# Patient Record
Sex: MALE | Race: WHITE | Employment: FULL TIME | URBAN - METROPOLITAN AREA
[De-identification: names, ages, dates, MRNs, and addresses within clinical notes are randomized per-mention and may not be internally consistent; named-entity substitution may affect disease eponyms.]

---

## 2018-06-08 ENCOUNTER — OFFICE VISIT (OUTPATIENT)
Dept: FAMILY MEDICINE CLINIC | Facility: CLINIC | Age: 43
End: 2018-06-08

## 2018-06-08 VITALS
DIASTOLIC BLOOD PRESSURE: 80 MMHG | BODY MASS INDEX: 38.22 KG/M2 | HEART RATE: 101 BPM | TEMPERATURE: 98 F | SYSTOLIC BLOOD PRESSURE: 138 MMHG | OXYGEN SATURATION: 98 % | HEIGHT: 69.5 IN | RESPIRATION RATE: 14 BRPM | WEIGHT: 264 LBS

## 2018-06-08 DIAGNOSIS — Z13.31 NEGATIVE DEPRESSION SCREENING: ICD-10-CM

## 2018-06-08 DIAGNOSIS — I10 ESSENTIAL HYPERTENSION: ICD-10-CM

## 2018-06-08 DIAGNOSIS — E78.5 HYPERLIPIDEMIA, UNSPECIFIED HYPERLIPIDEMIA TYPE: ICD-10-CM

## 2018-06-08 DIAGNOSIS — Z99.89 OSA ON CPAP: Primary | ICD-10-CM

## 2018-06-08 DIAGNOSIS — G47.33 OSA ON CPAP: Primary | ICD-10-CM

## 2018-06-08 PROBLEM — M54.50 ACUTE BILATERAL LOW BACK PAIN WITHOUT SCIATICA: Status: ACTIVE | Noted: 2017-02-23

## 2018-06-08 PROCEDURE — 99203 OFFICE O/P NEW LOW 30 MIN: CPT | Performed by: FAMILY MEDICINE

## 2018-06-08 RX ORDER — ASPIRIN 81 MG/1
81 TABLET, CHEWABLE ORAL
COMMUNITY
Start: 2015-02-11 | End: 2022-02-07

## 2018-06-08 RX ORDER — CYCLOBENZAPRINE HCL 10 MG
10 TABLET ORAL
COMMUNITY
Start: 2018-02-01 | End: 2018-06-15

## 2018-06-08 RX ORDER — LISINOPRIL AND HYDROCHLOROTHIAZIDE 25; 20 MG/1; MG/1
1 TABLET ORAL
COMMUNITY
Start: 2018-02-01 | End: 2018-06-15

## 2018-06-08 RX ORDER — ZOLPIDEM TARTRATE 10 MG/1
TABLET ORAL
COMMUNITY
Start: 2018-05-24 | End: 2018-06-25

## 2018-06-08 NOTE — PROGRESS NOTES
HPI:    Patient ID: Demond Yip is a 37year old male. HPI  Patient is here to establish care. He has a history of restless leg syndrome and had been tried on Ambien. Also has high blood pressure and takes medicine for it.   Also has high choles counseled at length regarding the above conditions. He does use CPAP for sleep apnea and complication of sleep sleep apnea discussed. Need for weight reduction discussed counseled regarding good diet and exercise. Blood pressure is borderline high.   We

## 2018-06-15 RX ORDER — ZOLPIDEM TARTRATE 10 MG/1
TABLET ORAL
Refills: 0 | Status: CANCELLED | OUTPATIENT
Start: 2018-06-15

## 2018-06-15 RX ORDER — LISINOPRIL AND HYDROCHLOROTHIAZIDE 25; 20 MG/1; MG/1
1 TABLET ORAL DAILY
Qty: 90 TABLET | Refills: 0 | Status: SHIPPED | OUTPATIENT
Start: 2018-06-15 | End: 2019-02-27

## 2018-06-15 RX ORDER — CYCLOBENZAPRINE HCL 10 MG
10 TABLET ORAL NIGHTLY PRN
Qty: 30 TABLET | Refills: 0 | Status: SHIPPED | OUTPATIENT
Start: 2018-06-15 | End: 2018-08-27

## 2018-06-15 NOTE — TELEPHONE ENCOUNTER
Requesting Zolpidem, Lisinopril/Hctz, Cyclobenzaprine  LOV: 6/8/18  RTC: not noted  Last Relevant Labs: none on file  Filled: we have never filled any of these medications    No future appointments.     Last [place]25[place]18 [place]

## 2018-06-15 NOTE — TELEPHONE ENCOUNTER
Pt was in last week and had given  the list of medications that need to be filled. New patient.     Zolpidem Tartrate 10 MG Oral Tab   5/24/2018     Sig: TAKE 1 TABLET BY MOUTH EVERY NIGHT AT BEDTIME AS NEEDED    Class: Historical      Lisinopril-

## 2018-06-25 RX ORDER — ZOLPIDEM TARTRATE 10 MG/1
TABLET ORAL
Qty: 30 TABLET | Refills: 0 | Status: SHIPPED | OUTPATIENT
Start: 2018-06-25 | End: 2018-07-23

## 2018-06-25 NOTE — TELEPHONE ENCOUNTER
Requested Medications   Zolpidem Tartrate 10 MG Oral Tab  N/A       Disp: Not specified Refills: 0    Class: Normal Start: 6/25/2018   Documented:2 weeks ago    LOV 6/8/18- new pt appt  Pt needs refill on med.  Never been prescribed by pcp  No future appoin

## 2018-06-25 NOTE — TELEPHONE ENCOUNTER
Pt recently became a pt of Dr. Aneudy Reyez and one of his scripts didn't get refilled     Zolpidem Tartrate 10 MG Oral Tab    Emi

## 2018-07-23 RX ORDER — ZOLPIDEM TARTRATE 10 MG/1
TABLET ORAL
Qty: 30 TABLET | Refills: 2 | Status: SHIPPED
Start: 2018-07-23 | End: 2018-10-25

## 2018-07-23 NOTE — TELEPHONE ENCOUNTER
Requesting Zolpidem   LOV: 6/8/18  RTC: 1/2018  Last Relevant Labs: n/a   Filled: 6/25/18 #30 with 0 refills    No future appointments[place]18 for #30/30 day supply. Pended if okay.

## 2018-08-28 RX ORDER — CYCLOBENZAPRINE HCL 10 MG
TABLET ORAL
Qty: 30 TABLET | Refills: 0 | Status: SHIPPED | OUTPATIENT
Start: 2018-08-28 | End: 2018-11-26

## 2018-08-28 NOTE — TELEPHONE ENCOUNTER
Requesting Cyclobenzaprine   LOV: 6/8/18  RTC: none   Last Relevant Labs: n/a   Filled: 6/15/18 #30 with 0 refills    No future appointments. Pended if okay.

## 2018-09-07 ENCOUNTER — HOSPITAL ENCOUNTER (OUTPATIENT)
Dept: CT IMAGING | Age: 43
Discharge: HOME | End: 2018-09-07
Attending: INTERNAL MEDICINE

## 2018-09-07 DIAGNOSIS — Z13.9 SCREENING FOR CONDITION: ICD-10-CM

## 2018-10-25 RX ORDER — ZOLPIDEM TARTRATE 10 MG/1
TABLET ORAL
Qty: 30 TABLET | Refills: 1 | Status: SHIPPED
Start: 2018-10-25 | End: 2019-02-27

## 2018-10-25 NOTE — TELEPHONE ENCOUNTER
Requesting Zolpidem  LOV: 6/8/18  RTC: not noted  Last Relevant Labs: n/a  Filled: 7/23/18 #30 with 2 refills    No future appointments.     Last [place] 9[place]29/18 [place]    I have pended a 2 month RX to have him f/u in December if you

## 2018-10-26 NOTE — TELEPHONE ENCOUNTER
Spoke to [facility] pharmacist at [address], called in prescription for Zolpidem Tartrate 10mg, pharmacists verbalized understanding with intent to inform pt when ready to be picked up.       Disp Refills Start End    ZOLPIDEM TARTRATE 10 MG Oral Tab

## 2018-11-28 NOTE — TELEPHONE ENCOUNTER
Requesting Cyclobenzaprine  LOV: 6/8/18  RTC: not noted  Last Relevant Labs: n/a  Filled: 8/28/18 #30 with 0 refills    No future appointments.     *used for prn back spasms    Non protocol med pended for approval

## 2018-11-29 RX ORDER — CYCLOBENZAPRINE HCL 10 MG
TABLET ORAL
Qty: 30 TABLET | Refills: 0 | Status: SHIPPED | OUTPATIENT
Start: 2018-11-29 | End: 2019-02-27

## 2019-01-01 ENCOUNTER — MED REC SCAN ONLY (OUTPATIENT)
Dept: FAMILY MEDICINE CLINIC | Facility: CLINIC | Age: 44
End: 2019-01-01

## 2019-02-06 ENCOUNTER — TELEPHONE (OUTPATIENT)
Dept: FAMILY MEDICINE CLINIC | Facility: CLINIC | Age: 44
End: 2019-02-06

## 2019-02-06 NOTE — TELEPHONE ENCOUNTER
Future Appointments   Date Time Provider Radha Jasmyn   2/9/2019  8:40 AM Mary Galloway MD [facility] [address] [facility] [address]     Lab report in MA blue bin. Will hold until appt.

## 2019-02-09 ENCOUNTER — OFFICE VISIT (OUTPATIENT)
Dept: FAMILY MEDICINE CLINIC | Facility: CLINIC | Age: 44
End: 2019-02-09
Payer: COMMERCIAL

## 2019-02-09 VITALS
HEIGHT: 69.5 IN | RESPIRATION RATE: 16 BRPM | HEART RATE: 101 BPM | SYSTOLIC BLOOD PRESSURE: 130 MMHG | BODY MASS INDEX: 38.95 KG/M2 | WEIGHT: 269 LBS | OXYGEN SATURATION: 99 % | DIASTOLIC BLOOD PRESSURE: 80 MMHG | TEMPERATURE: 99 F

## 2019-02-09 DIAGNOSIS — Z00.00 ROUTINE ADULT HEALTH MAINTENANCE: Primary | ICD-10-CM

## 2019-02-09 DIAGNOSIS — R73.03 PREDIABETES: ICD-10-CM

## 2019-02-09 PROCEDURE — 99396 PREV VISIT EST AGE 40-64: CPT | Performed by: FAMILY MEDICINE

## 2019-02-09 NOTE — PROGRESS NOTES
Sawyer Monteiro is a 37year old male who presents for a complete physical exam.   HPI:   Pt complains of nothing.   Urination changes no  ED symptoms no  Immunizations needed no  Wt Readings from Last 6 Encounters:  02/09/19 : 269 lb  06/08/18 : 264 lb glands/polyuria/polydypsia  ALL/ASTHMA: denies allergic rhinitis/asthma    EXAM:   /80   Pulse 101   Temp 99.2 °F (37.3 °C) (Temporal)   Resp 16   Ht 69.5\"   Wt 269 lb   SpO2 99%   BMI 39.15 kg/m²   Body mass index is 39.15 kg/m².    GENERAL: NAD, pl coronary artery disease screening every 3-5 years. Information given from [facility] so patient can schedule it.      Above age 48 or age 36 if family history of colon or prostate cancer, patient instructed to get colon cancer screening and prostate cancer scre

## 2019-02-28 RX ORDER — LISINOPRIL AND HYDROCHLOROTHIAZIDE 25; 20 MG/1; MG/1
1 TABLET ORAL DAILY
Qty: 90 TABLET | Refills: 0 | Status: SHIPPED | OUTPATIENT
Start: 2019-02-28 | End: 2019-07-11

## 2019-02-28 RX ORDER — CYCLOBENZAPRINE HCL 10 MG
TABLET ORAL
Qty: 30 TABLET | Refills: 0 | Status: SHIPPED | OUTPATIENT
Start: 2019-02-28 | End: 2021-05-19

## 2019-02-28 RX ORDER — ZOLPIDEM TARTRATE 10 MG/1
TABLET ORAL
Qty: 30 TABLET | Refills: 0 | Status: SHIPPED | OUTPATIENT
Start: 2019-02-28 | End: 2019-03-28

## 2019-02-28 NOTE — TELEPHONE ENCOUNTER
Patient can f/u with me for weight mgmt as well in the next week if desired. Strongly recommend weight reduction.

## 2019-03-28 NOTE — TELEPHONE ENCOUNTER
Requesting ZOLPIDEM TARTRATE 10 MG Oral   LOV: 2/9/19  RTC: 6 mos  Last Labs: n/a  Filled: 2/28/19 #30with 0 refills    No future appointments.    Dispensed Written Strength Quantity Refills Days Supply Provider Pharmacy   ZOLPIDEM TARTRATE 01/26/2019 10/25

## 2019-03-29 RX ORDER — ZOLPIDEM TARTRATE 10 MG/1
TABLET ORAL
Qty: 30 TABLET | Refills: 0 | Status: SHIPPED
Start: 2019-03-29 | End: 2019-04-27

## 2019-04-30 RX ORDER — ZOLPIDEM TARTRATE 10 MG/1
TABLET ORAL
Qty: 30 TABLET | Refills: 2 | Status: SHIPPED
Start: 2019-04-30 | End: 2019-07-25

## 2019-04-30 NOTE — TELEPHONE ENCOUNTER
Requesting ZOLPIDEM TARTRATE 10 MG   LOV: 2/9/19  RTC: 6 months  Last Relevant Labs:   Filled: 3/29/19 # 30 with 0 refills    No future appointments.     ZOLPIDEM TARTRATE 01/26/2019 10/25/2018 10MG 30 tablet 1 30 , Anni Patel MD

## 2019-06-26 RX ORDER — FLUTICASONE PROPIONATE 50 MCG
2 SPRAY, SUSPENSION (ML) NASAL DAILY
Qty: 16 G | Refills: 1 | Status: SHIPPED | OUTPATIENT
Start: 2019-06-26 | End: 2019-09-19

## 2019-06-26 NOTE — TELEPHONE ENCOUNTER
Patient states he uses Flonase intermittently and needs a refill. [facility] will not fill it, they say it has been too long, please advise.

## 2019-06-26 NOTE — TELEPHONE ENCOUNTER
See phone message. Pt states he uses Flonase as needed and is requesting refill. Rx pended. Please advise.

## 2019-07-12 RX ORDER — LISINOPRIL AND HYDROCHLOROTHIAZIDE 25; 20 MG/1; MG/1
1 TABLET ORAL DAILY
Qty: 30 TABLET | Refills: 0 | Status: SHIPPED | OUTPATIENT
Start: 2019-07-12 | End: 2019-09-19

## 2019-07-25 NOTE — TELEPHONE ENCOUNTER
Requesting Zolpidem 10mg  LOV: 2/9/19 for physical  RTC: 6 months  Last Relevant Labs: 2/9/19  Filled: 4/30/19 #30 with 2 refills    No future appointments.     [place] 5[place]30/19 #30    Rx pended for approval/denial

## 2019-07-26 RX ORDER — ZOLPIDEM TARTRATE 10 MG/1
TABLET ORAL
Qty: 30 TABLET | Refills: 0 | Status: SHIPPED | OUTPATIENT
Start: 2019-07-26 | End: 2019-08-29

## 2019-07-26 NOTE — TELEPHONE ENCOUNTER
Faxed Rx for Zolpidem 10mg approved by Dr. Jenene Cabot 7/26/19 to [address]. Fax confirmation received.

## 2019-08-30 RX ORDER — ZOLPIDEM TARTRATE 10 MG/1
TABLET ORAL
Qty: 30 TABLET | Refills: 0 | Status: SHIPPED | OUTPATIENT
Start: 2019-08-30 | End: 2019-09-19

## 2019-08-30 NOTE — TELEPHONE ENCOUNTER
Requested Prescriptions     Pending Prescriptions Disp Refills   • ZOLPIDEM TARTRATE 10 MG Oral Tab [Pharmacy Med Name: ZOLPIDEM 10MG TABLETS] 30 tablet 0     Sig: TAKE 1 TABLET BY MOUTH EVERY DAY AT BEDTIME AS NEEDED     Non protocol medication    LOV: 2/

## 2019-09-04 NOTE — TELEPHONE ENCOUNTER
Requested Prescriptions     Pending Prescriptions Disp Refills   • LISINOPRIL-HYDROCHLOROTHIAZIDE 20-25 MG Oral Tab [Pharmacy Med Name: LISINOPRIL-HCTZ 20/25MG TABLETS] 30 tablet 0     Sig: TAKE 1 TABLET BY MOUTH DAILY, PATIENT NEEDS AN APPOINTMENT     LOV

## 2019-09-08 DIAGNOSIS — I10 ESSENTIAL HYPERTENSION: Primary | ICD-10-CM

## 2019-09-11 RX ORDER — CYCLOBENZAPRINE HCL 10 MG
TABLET ORAL
Qty: 30 TABLET | Refills: 0 | OUTPATIENT
Start: 2019-09-11

## 2019-09-11 RX ORDER — LISINOPRIL AND HYDROCHLOROTHIAZIDE 25; 20 MG/1; MG/1
TABLET ORAL
Qty: 30 TABLET | Refills: 0 | OUTPATIENT
Start: 2019-09-11

## 2019-09-11 RX ORDER — LISINOPRIL AND HYDROCHLOROTHIAZIDE 25; 20 MG/1; MG/1
1 TABLET ORAL DAILY
Qty: 90 TABLET | Refills: 0 | OUTPATIENT
Start: 2019-09-11

## 2019-09-11 NOTE — TELEPHONE ENCOUNTER
Requested Prescriptions     Pending Prescriptions Disp Refills   • LISINOPRIL-HYDROCHLOROTHIAZIDE 20-25 MG Oral Tab [Pharmacy Med Name: LISINOPRIL-HCTZ 20/25MG TABLETS] 90 tablet 0     Sig: TAKE 1 TABLET BY MOUTH DAILY   • CYCLOBENZAPRINE HCL 10 MG Oral Ta

## 2019-09-12 RX ORDER — LISINOPRIL AND HYDROCHLOROTHIAZIDE 25; 20 MG/1; MG/1
TABLET ORAL
Qty: 30 TABLET | Refills: 0 | OUTPATIENT
Start: 2019-09-12

## 2019-09-12 RX ORDER — CYCLOBENZAPRINE HCL 10 MG
TABLET ORAL
Qty: 30 TABLET | Refills: 0 | OUTPATIENT
Start: 2019-09-12

## 2019-09-12 NOTE — TELEPHONE ENCOUNTER
meds denied, needs cmp done nonfasting and f/u appt needed. Last appt >6mths ago. Twice a year appts recommended.

## 2019-09-12 NOTE — TELEPHONE ENCOUNTER
[email or web address] to contact the office to schedule an office visit and complete labs

## 2019-09-19 ENCOUNTER — OFFICE VISIT (OUTPATIENT)
Dept: FAMILY MEDICINE CLINIC | Facility: CLINIC | Age: 44
End: 2019-09-19
Payer: COMMERCIAL

## 2019-09-19 VITALS
WEIGHT: 272 LBS | TEMPERATURE: 99 F | HEIGHT: 69.5 IN | DIASTOLIC BLOOD PRESSURE: 90 MMHG | SYSTOLIC BLOOD PRESSURE: 140 MMHG | HEART RATE: 94 BPM | RESPIRATION RATE: 16 BRPM | BODY MASS INDEX: 39.38 KG/M2 | OXYGEN SATURATION: 98 %

## 2019-09-19 DIAGNOSIS — F51.01 PRIMARY INSOMNIA: ICD-10-CM

## 2019-09-19 DIAGNOSIS — I10 ESSENTIAL HYPERTENSION: Primary | ICD-10-CM

## 2019-09-19 DIAGNOSIS — J30.9 ALLERGIC RHINITIS, UNSPECIFIED SEASONALITY, UNSPECIFIED TRIGGER: ICD-10-CM

## 2019-09-19 PROCEDURE — 90471 IMMUNIZATION ADMIN: CPT | Performed by: FAMILY MEDICINE

## 2019-09-19 PROCEDURE — 99214 OFFICE O/P EST MOD 30 MIN: CPT | Performed by: FAMILY MEDICINE

## 2019-09-19 PROCEDURE — 90686 IIV4 VACC NO PRSV 0.5 ML IM: CPT | Performed by: FAMILY MEDICINE

## 2019-09-20 RX ORDER — LISINOPRIL AND HYDROCHLOROTHIAZIDE 25; 20 MG/1; MG/1
1 TABLET ORAL DAILY
Qty: 90 TABLET | Refills: 3 | Status: SHIPPED | OUTPATIENT
Start: 2019-09-20 | End: 2020-09-29

## 2019-09-20 RX ORDER — ZOLPIDEM TARTRATE 10 MG/1
TABLET ORAL
Qty: 30 TABLET | Refills: 2 | Status: SHIPPED | OUTPATIENT
Start: 2019-09-20 | End: 2019-12-26

## 2019-09-20 RX ORDER — FLUTICASONE PROPIONATE 50 MCG
2 SPRAY, SUSPENSION (ML) NASAL DAILY
Qty: 16 G | Refills: 3 | Status: SHIPPED | OUTPATIENT
Start: 2019-09-20 | End: 2019-12-19

## 2019-12-26 RX ORDER — ZOLPIDEM TARTRATE 10 MG/1
TABLET ORAL
Qty: 30 TABLET | Refills: 0 | Status: SHIPPED | OUTPATIENT
Start: 2019-12-26 | End: 2020-01-28

## 2019-12-26 NOTE — TELEPHONE ENCOUNTER
Requesting Zolpideam 10mg  LOV: 9/19/19  RTC: prn  Last Relevant Labs: 2/14/19  Filled: 9/20/19 #30 with 2 refills    No future appointments.     Rx pended and routed to MD for approval/denial

## 2020-01-01 ENCOUNTER — MED REC SCAN ONLY (OUTPATIENT)
Dept: FAMILY MEDICINE CLINIC | Facility: CLINIC | Age: 45
End: 2020-01-01

## 2020-01-16 LAB
AMB EXT CHOL/HDL RATIO: 4.6
AMB EXT CHOLESTEROL, TOTAL: 224 MG/DL
AMB EXT GLUCOSE: 106 MG/DL
AMB EXT HDL CHOLESTEROL: 49 MG/DL
AMB EXT HEMATOCRIT: 49.1
AMB EXT HEMOGLOBIN: 16
AMB EXT LDL CHOLESTEROL, DIRECT: 142 MG/DL
AMB EXT MCV: 82
AMB EXT PLATELETS: 272
AMB EXT TRIGLYCERIDES: 165 MG/DL
AMB EXT WBC: 8.3 X10(3)UL

## 2020-01-28 RX ORDER — ZOLPIDEM TARTRATE 10 MG/1
TABLET ORAL
Qty: 30 TABLET | Refills: 0 | Status: SHIPPED
Start: 2020-01-28 | End: 2020-03-04

## 2020-01-28 NOTE — TELEPHONE ENCOUNTER
Name from pharmacy: ZOLPIDEM 10MG TABLETS         Will file in chart as: ZOLPIDEM TARTRATE 10 MG Oral Tab         Sig: TAKE 1 TABLET BY MOUTH EVERY NIGHT AT BEDTIME AS NEEDED    Disp:  30 tablet    Refills:  0 (Pharmacy requested: Not specified)    Start:

## 2020-01-29 ENCOUNTER — OFFICE VISIT (OUTPATIENT)
Dept: FAMILY MEDICINE CLINIC | Facility: CLINIC | Age: 45
End: 2020-01-29
Payer: COMMERCIAL

## 2020-01-29 VITALS
DIASTOLIC BLOOD PRESSURE: 82 MMHG | HEIGHT: 69.5 IN | HEART RATE: 87 BPM | OXYGEN SATURATION: 92 % | SYSTOLIC BLOOD PRESSURE: 130 MMHG | TEMPERATURE: 99 F | BODY MASS INDEX: 40.37 KG/M2 | RESPIRATION RATE: 16 BRPM | WEIGHT: 278.81 LBS

## 2020-01-29 DIAGNOSIS — E66.01 CLASS 3 SEVERE OBESITY DUE TO EXCESS CALORIES WITHOUT SERIOUS COMORBIDITY WITH BODY MASS INDEX (BMI) OF 40.0 TO 44.9 IN ADULT (HCC): ICD-10-CM

## 2020-01-29 DIAGNOSIS — E78.5 HYPERLIPIDEMIA, UNSPECIFIED HYPERLIPIDEMIA TYPE: ICD-10-CM

## 2020-01-29 DIAGNOSIS — N52.9 ERECTILE DYSFUNCTION, UNSPECIFIED ERECTILE DYSFUNCTION TYPE: ICD-10-CM

## 2020-01-29 DIAGNOSIS — Z00.00 ROUTINE ADULT HEALTH MAINTENANCE: Primary | ICD-10-CM

## 2020-01-29 DIAGNOSIS — R73.03 PREDIABETES: ICD-10-CM

## 2020-01-29 PROCEDURE — 99396 PREV VISIT EST AGE 40-64: CPT | Performed by: FAMILY MEDICINE

## 2020-01-29 PROCEDURE — 99214 OFFICE O/P EST MOD 30 MIN: CPT | Performed by: FAMILY MEDICINE

## 2020-01-29 RX ORDER — TADALAFIL 10 MG/1
TABLET ORAL
Qty: 6 TABLET | Refills: 0 | Status: SHIPPED | OUTPATIENT
Start: 2020-01-29

## 2020-01-30 NOTE — PROGRESS NOTES
Tristan Blackburn is a 40year old male who presents for a complete physical exam.   HPI:   Pt complains of having problems maintaining erection. He would like to try medication for it.   Urination changes no  ED symptoms no  Immunizations needed no  Wt Re joint or muscle aches or pains  NEURO: denies headaches/dizziness  PSYCH: denies depression or anxiety  HEMATOLOGIC: denies easy bruising/bleeding/anemia/blood clot disorders  ENDOCRINE: denies weight gain/weight loss/enlargement of neck glands/polyuria/po Tika Yeboah is a 40year old male who presents for a complete physical exam.     Cole Rivera was seen today for lab results.     Diagnoses and all orders for this visit:    Routine adult health maintenance    Class 3 severe obesity due to excess calories without se

## 2020-02-05 ENCOUNTER — TELEPHONE (OUTPATIENT)
Dept: FAMILY MEDICINE CLINIC | Facility: CLINIC | Age: 45
End: 2020-02-05

## 2020-02-05 NOTE — TELEPHONE ENCOUNTER
Patient calling for PA for Tadalafil, pharmacy stated that provider had to submit authorization in order for him to receive medication

## 2020-02-12 NOTE — TELEPHONE ENCOUNTER
Received PA approval from [employer]. Tadalafil 10 mg has been granted 2/8/2020- 2/8/2021. Informed patient pharmacy as well. Attempted to call patient cell phone number, pt did not answer.  I was unable to leave a voicemail , voicemail box is full

## 2020-02-17 ENCOUNTER — APPOINTMENT (OUTPATIENT)
Dept: LAB | Age: 45
End: 2020-02-17
Attending: FAMILY MEDICINE
Payer: COMMERCIAL

## 2020-02-17 DIAGNOSIS — I10 ESSENTIAL HYPERTENSION: ICD-10-CM

## 2020-02-17 DIAGNOSIS — E66.01 CLASS 3 SEVERE OBESITY DUE TO EXCESS CALORIES WITHOUT SERIOUS COMORBIDITY WITH BODY MASS INDEX (BMI) OF 40.0 TO 44.9 IN ADULT (HCC): ICD-10-CM

## 2020-02-17 LAB
ALBUMIN SERPL-MCNC: 3.8 G/DL (ref 3.4–5)
ALBUMIN/GLOB SERPL: 1.1 {RATIO} (ref 1–2)
ALP LIVER SERPL-CCNC: 74 U/L (ref 45–117)
ALT SERPL-CCNC: 28 U/L (ref 16–61)
ANION GAP SERPL CALC-SCNC: 5 MMOL/L (ref 0–18)
AST SERPL-CCNC: 17 U/L (ref 15–37)
BILIRUB SERPL-MCNC: 0.3 MG/DL (ref 0.1–2)
BUN BLD-MCNC: 16 MG/DL (ref 7–18)
BUN/CREAT SERPL: 14.8 (ref 10–20)
CALCIUM BLD-MCNC: 8.9 MG/DL (ref 8.5–10.1)
CHLORIDE SERPL-SCNC: 105 MMOL/L (ref 98–112)
CO2 SERPL-SCNC: 28 MMOL/L (ref 21–32)
CREAT BLD-MCNC: 1.08 MG/DL (ref 0.7–1.3)
GLOBULIN PLAS-MCNC: 3.6 G/DL (ref 2.8–4.4)
GLUCOSE BLD-MCNC: 95 MG/DL (ref 70–99)
M PROTEIN MFR SERPL ELPH: 7.4 G/DL (ref 6.4–8.2)
OSMOLALITY SERPL CALC.SUM OF ELEC: 287 MOSM/KG (ref 275–295)
PATIENT FASTING Y/N/NP: NO
POTASSIUM SERPL-SCNC: 3.5 MMOL/L (ref 3.5–5.1)
SODIUM SERPL-SCNC: 138 MMOL/L (ref 136–145)
T4 FREE SERPL-MCNC: 1 NG/DL (ref 0.8–1.7)
TSI SER-ACNC: 1.49 MIU/ML (ref 0.36–3.74)

## 2020-02-17 PROCEDURE — 36415 COLL VENOUS BLD VENIPUNCTURE: CPT | Performed by: FAMILY MEDICINE

## 2020-02-17 PROCEDURE — 84439 ASSAY OF FREE THYROXINE: CPT | Performed by: FAMILY MEDICINE

## 2020-02-17 PROCEDURE — 84443 ASSAY THYROID STIM HORMONE: CPT | Performed by: FAMILY MEDICINE

## 2020-02-17 PROCEDURE — 80053 COMPREHEN METABOLIC PANEL: CPT | Performed by: FAMILY MEDICINE

## 2020-02-18 ENCOUNTER — TELEPHONE (OUTPATIENT)
Dept: FAMILY MEDICINE CLINIC | Facility: CLINIC | Age: 45
End: 2020-02-18

## 2020-03-06 RX ORDER — ZOLPIDEM TARTRATE 10 MG/1
TABLET ORAL
Qty: 30 TABLET | Refills: 0 | Status: SHIPPED | OUTPATIENT
Start: 2020-03-06 | End: 2020-04-03

## 2020-03-06 NOTE — TELEPHONE ENCOUNTER
Dr.Dongre- Dsouza is calling to follow up on rx request   ZOLPIDEM TARTRATE 10 MG Oral Tab 30 tablet 0 1/28/2020    Sig:   TAKE 1 TABLET BY MOUTH EVERY NIGHT AT BEDTIME AS NEEDED     Route:   (none)             [id number] - [address] [address]

## 2020-04-03 RX ORDER — ZOLPIDEM TARTRATE 10 MG/1
TABLET ORAL
Qty: 30 TABLET | Refills: 0 | Status: SHIPPED | OUTPATIENT
Start: 2020-04-03 | End: 2020-05-04

## 2020-04-03 RX ORDER — ZOLPIDEM TARTRATE 10 MG/1
TABLET ORAL
Qty: 90 TABLET | Refills: 0 | OUTPATIENT
Start: 2020-04-03

## 2020-04-03 NOTE — TELEPHONE ENCOUNTER
Requesting ZOLPIDEM TARTRATE 10 MG   LOV: 1/29/20  RTC:   Last Relevant Labs: 2/17/20  Filled: 3/6/20 #30 with 0 refills    No future appointments.     ZOLPIDEM TARTRATE 01/28/2020 01/28/2020 10MG 30 tablet 0 30 , Anish Mcconnell MD

## 2020-05-04 RX ORDER — ZOLPIDEM TARTRATE 10 MG/1
TABLET ORAL
Qty: 30 TABLET | Refills: 2 | Status: SHIPPED | OUTPATIENT
Start: 2020-05-04 | End: 2020-07-30

## 2020-05-04 NOTE — TELEPHONE ENCOUNTER
Requesting : ZOLPIDEM TARTRATE 10 MG   LOV: 1/29/20  RTC:   Last Relevant Labs: 2/17/20  Filled: 4/3/20 # 30 with 0 refills    No future appointments.     ZOLPIDEM TARTRATE 01/28/2020 01/28/2020 10MG 30 tablet 0 30 , Shannan Huitron MD

## 2020-07-29 NOTE — TELEPHONE ENCOUNTER
Requesting Zolpidem 10mg  LOV: 1/29/2020  RTC: prn  Last Relevant Labs: 2/17/2020  Filled: 5/4/2020 #30 with 2 refills    No future appointments.     [place] 6/30/2020 #30    Rx pended and routed for approval/denial

## 2020-07-30 RX ORDER — ZOLPIDEM TARTRATE 10 MG/1
TABLET ORAL
Qty: 30 TABLET | Refills: 0 | Status: SHIPPED | OUTPATIENT
Start: 2020-07-30 | End: 2020-08-05

## 2020-08-04 ENCOUNTER — TELEPHONE (OUTPATIENT)
Dept: FAMILY MEDICINE CLINIC | Facility: CLINIC | Age: 45
End: 2020-08-04

## 2020-08-05 ENCOUNTER — TELEPHONE (OUTPATIENT)
Dept: FAMILY MEDICINE CLINIC | Facility: CLINIC | Age: 45
End: 2020-08-05

## 2020-08-05 RX ORDER — ZOLPIDEM TARTRATE 10 MG/1
10 TABLET ORAL NIGHTLY PRN
Qty: 30 TABLET | Refills: 0 | Status: SHIPPED | OUTPATIENT
Start: 2020-08-05 | End: 2020-09-29

## 2020-08-05 NOTE — TELEPHONE ENCOUNTER
Pt needs his ZOLPIDEM TARTRATE 10 MG Oral Tab [facility] because [facility] does not have any there on back order

## 2020-09-28 NOTE — TELEPHONE ENCOUNTER
Name from pharmacy: ZOLPIDEM 10MG TABLETS          Will file in chart as: ZOLPIDEM TARTRATE 10 MG Oral Tab    Sig: TAKE 1 TABLET BY MOUTH EVERY NIGHT AT BEDTIME AS NEEDED    Disp:  30 tablet    Refills:  0 (Pharmacy requested: Not specified)    Start: 9/26

## 2020-09-29 RX ORDER — ZOLPIDEM TARTRATE 10 MG/1
TABLET ORAL
Qty: 30 TABLET | Refills: 0 | Status: SHIPPED | OUTPATIENT
Start: 2020-09-29 | End: 2020-11-03

## 2020-09-29 RX ORDER — LISINOPRIL AND HYDROCHLOROTHIAZIDE 25; 20 MG/1; MG/1
1 TABLET ORAL DAILY
Qty: 90 TABLET | Refills: 3 | Status: SHIPPED | OUTPATIENT
Start: 2020-09-29 | End: 2020-12-16

## 2020-11-03 NOTE — TELEPHONE ENCOUNTER
Requesting Zolpidem 10mg  LOV: 1/29/2020 Physical  RTC: 1 year  Last Relevant Labs: 2/17/2020  Filled: 9/29/2020 #30 with 0 refills    No future appointments.     [place] 9/29/2020 #30    Rx pended and routed for approval/denial

## 2020-11-04 RX ORDER — ZOLPIDEM TARTRATE 10 MG/1
10 TABLET ORAL NIGHTLY PRN
Qty: 30 TABLET | Refills: 0 | Status: SHIPPED | OUTPATIENT
Start: 2020-11-04 | End: 2020-12-07

## 2020-12-01 RX ORDER — ZOLPIDEM TARTRATE 10 MG/1
TABLET ORAL
Qty: 30 TABLET | Refills: 0 | OUTPATIENT
Start: 2020-12-01

## 2020-12-01 RX ORDER — CYCLOBENZAPRINE HCL 10 MG
TABLET ORAL
Qty: 30 TABLET | Refills: 0 | OUTPATIENT
Start: 2020-12-01

## 2020-12-01 NOTE — TELEPHONE ENCOUNTER
Requesting Zolpidem 10mg  LOV: 1/29/2020 Physical  RTC: 1 year  Last Relevant Labs: 2/17/2020  Filled: 11/4/2020 #30 with 0 refills    Requesting Cyclobenzaprine 10mg  LOV: 1/29/2020 physical  RTC: 1 year  Last Relevant Labs: 2/17/2020  Filled: 2/28/19 #30 with 0 refills    No future appointments.     RX pended and routed for approval/denial

## 2020-12-07 RX ORDER — ZOLPIDEM TARTRATE 10 MG/1
10 TABLET ORAL NIGHTLY PRN
Qty: 10 TABLET | Refills: 0 | Status: SHIPPED | OUTPATIENT
Start: 2020-12-07 | End: 2021-01-14

## 2020-12-07 NOTE — TELEPHONE ENCOUNTER
Patient is scheduled for Dec 16, that is the 1st available date he has, can we sent him in enough Zolpidem to get him through. RX is pended.

## 2020-12-07 NOTE — TELEPHONE ENCOUNTER
Future Appointments   Date Time Provider Radha Vines   12/16/2020  3:00 PM Leslie Chapin MD [facility] [address] [facility] [address]

## 2020-12-07 NOTE — TELEPHONE ENCOUNTER
Pt will need a refill on the Zolpidem sent to local [facility]. We had to reschedule his appt due to scheduling error.   Future Appointments   Date Time Provider Radha Vines   12/16/2020  3:00 PM Rusty Sanchez MD [facility] [address] [facility] [address]     He

## 2020-12-16 ENCOUNTER — OFFICE VISIT (OUTPATIENT)
Dept: FAMILY MEDICINE CLINIC | Facility: CLINIC | Age: 45
End: 2020-12-16
Payer: COMMERCIAL

## 2020-12-16 VITALS
DIASTOLIC BLOOD PRESSURE: 62 MMHG | HEART RATE: 48 BPM | BODY MASS INDEX: 39.6 KG/M2 | RESPIRATION RATE: 16 BRPM | TEMPERATURE: 97 F | WEIGHT: 273.5 LBS | HEIGHT: 69.5 IN | SYSTOLIC BLOOD PRESSURE: 118 MMHG

## 2020-12-16 DIAGNOSIS — F51.01 PRIMARY INSOMNIA: ICD-10-CM

## 2020-12-16 DIAGNOSIS — I10 ESSENTIAL HYPERTENSION: ICD-10-CM

## 2020-12-16 DIAGNOSIS — N52.9 ERECTILE DYSFUNCTION, UNSPECIFIED ERECTILE DYSFUNCTION TYPE: ICD-10-CM

## 2020-12-16 DIAGNOSIS — R00.1 BRADYCARDIA: Primary | ICD-10-CM

## 2020-12-16 PROCEDURE — 99215 OFFICE O/P EST HI 40 MIN: CPT | Performed by: FAMILY MEDICINE

## 2020-12-16 PROCEDURE — 3074F SYST BP LT 130 MM HG: CPT | Performed by: FAMILY MEDICINE

## 2020-12-16 PROCEDURE — 3008F BODY MASS INDEX DOCD: CPT | Performed by: FAMILY MEDICINE

## 2020-12-16 PROCEDURE — 3078F DIAST BP <80 MM HG: CPT | Performed by: FAMILY MEDICINE

## 2020-12-16 RX ORDER — CYCLOBENZAPRINE HCL 10 MG
10 TABLET ORAL NIGHTLY PRN
Qty: 30 TABLET | Refills: 0 | Status: CANCELLED | OUTPATIENT
Start: 2020-12-16

## 2020-12-16 RX ORDER — TADALAFIL 10 MG/1
TABLET ORAL
Qty: 6 TABLET | Refills: 0 | Status: CANCELLED | OUTPATIENT
Start: 2020-12-16

## 2020-12-16 RX ORDER — ZOLPIDEM TARTRATE 10 MG/1
10 TABLET ORAL NIGHTLY PRN
Qty: 10 TABLET | Refills: 0 | Status: CANCELLED | OUTPATIENT
Start: 2020-12-16

## 2020-12-16 RX ORDER — LISINOPRIL AND HYDROCHLOROTHIAZIDE 25; 20 MG/1; MG/1
1 TABLET ORAL DAILY
Qty: 90 TABLET | Refills: 3 | Status: SHIPPED | OUTPATIENT
Start: 2020-12-16 | End: 2021-06-18

## 2020-12-17 ENCOUNTER — EKG ENCOUNTER (OUTPATIENT)
Dept: LAB | Age: 45
End: 2020-12-17
Attending: FAMILY MEDICINE
Payer: COMMERCIAL

## 2020-12-17 ENCOUNTER — LAB ENCOUNTER (OUTPATIENT)
Dept: LAB | Age: 45
End: 2020-12-17
Attending: FAMILY MEDICINE
Payer: COMMERCIAL

## 2020-12-17 DIAGNOSIS — R00.1 BRADYCARDIA: ICD-10-CM

## 2020-12-17 PROCEDURE — 85027 COMPLETE CBC AUTOMATED: CPT

## 2020-12-17 PROCEDURE — 36415 COLL VENOUS BLD VENIPUNCTURE: CPT

## 2020-12-17 PROCEDURE — 93005 ELECTROCARDIOGRAM TRACING: CPT

## 2020-12-17 PROCEDURE — 93010 ELECTROCARDIOGRAM REPORT: CPT | Performed by: INTERNAL MEDICINE

## 2020-12-17 PROCEDURE — 84481 FREE ASSAY (FT-3): CPT

## 2020-12-17 PROCEDURE — 84439 ASSAY OF FREE THYROXINE: CPT

## 2020-12-17 PROCEDURE — 80048 BASIC METABOLIC PNL TOTAL CA: CPT

## 2020-12-17 PROCEDURE — 84443 ASSAY THYROID STIM HORMONE: CPT

## 2020-12-19 ENCOUNTER — PATIENT MESSAGE (OUTPATIENT)
Dept: FAMILY MEDICINE CLINIC | Facility: CLINIC | Age: 45
End: 2020-12-19

## 2020-12-19 DIAGNOSIS — I49.3 PVC (PREMATURE VENTRICULAR CONTRACTION): Primary | ICD-10-CM

## 2020-12-19 DIAGNOSIS — R94.31 ABNORMAL EKG: ICD-10-CM

## 2020-12-19 RX ORDER — ZOLPIDEM TARTRATE 10 MG/1
10 TABLET ORAL NIGHTLY PRN
Qty: 10 TABLET | Refills: 0 | Status: CANCELLED | OUTPATIENT
Start: 2020-12-19

## 2020-12-21 NOTE — TELEPHONE ENCOUNTER
From: Ann Marie Sevilla  To: Esdras Ham MD  Sent: 12/19/2020 9:39 AM CST  Subject: Test Results Rosalva Pete,     I was looking at the test results and as best I can tell everything seems to be within the normal range.  If not please le

## 2020-12-22 RX ORDER — ZOLPIDEM TARTRATE 10 MG/1
10 TABLET ORAL NIGHTLY PRN
Refills: 0 | OUTPATIENT
Start: 2020-12-22

## 2020-12-22 NOTE — TELEPHONE ENCOUNTER
Labs are fine, but ekg shows some irregular beats called pvc's. Pt needs to have eval by cardiology for this before any sleep aid should be given.

## 2020-12-23 RX ORDER — ZOLPIDEM TARTRATE 10 MG/1
10 TABLET ORAL NIGHTLY PRN
Qty: 30 TABLET | Refills: 0 | OUTPATIENT
Start: 2020-12-23

## 2020-12-23 NOTE — TELEPHONE ENCOUNTER
Dr.Dongre- Dsouza is calling to follow up on refills and test results EKG and Labs. Patient states it was discussed that medications would be renewed once labs were complete. Doctor may want to renew all meds?     Please advise Ph [phone]

## 2020-12-23 NOTE — TELEPHONE ENCOUNTER
Lab results and EKG available from 12/17/2020. Please advise on results. Medication Quantity Refills Start End   Zolpidem Tartrate 10 MG Oral Tab 10 tablet 0 12/7/2020      Last OV 12/16/2020  No future appointments. Medication pended.  Please advis

## 2020-12-28 ENCOUNTER — TELEPHONE (OUTPATIENT)
Dept: FAMILY MEDICINE CLINIC | Facility: CLINIC | Age: 45
End: 2020-12-28

## 2020-12-28 NOTE — TELEPHONE ENCOUNTER
Patient was referred to Dr. Shirin Amin on 12/23/20.    Recommend another cardiologist? Please advise

## 2020-12-29 ENCOUNTER — TELEPHONE (OUTPATIENT)
Dept: FAMILY MEDICINE CLINIC | Facility: CLINIC | Age: 45
End: 2020-12-29

## 2020-12-29 NOTE — TELEPHONE ENCOUNTER
Patient states Dr. Carmela Lora' office needs a copy of the ekg done in [place], FAX to [place] @ [phone].

## 2020-12-29 NOTE — TELEPHONE ENCOUNTER
Spoke to patient. Advised that Dr. Roxie Christy is in [place] and referral was authorized. Also provided info for Dr. Dhruv Sanderson. Patient will call Dr. Roxie Christy again to schedule.

## 2020-12-30 RX ORDER — TADALAFIL 10 MG/1
TABLET ORAL
COMMUNITY
Start: 2020-01-29 | End: 2021-01-04

## 2020-12-30 RX ORDER — ZOLPIDEM TARTRATE 10 MG/1
10 TABLET ORAL PRN
COMMUNITY
Start: 2018-06-18 | End: 2021-01-04

## 2020-12-30 RX ORDER — LISINOPRIL AND HYDROCHLOROTHIAZIDE 25; 20 MG/1; MG/1
1 TABLET ORAL DAILY
COMMUNITY
Start: 2018-02-01

## 2020-12-30 RX ORDER — CYCLOBENZAPRINE HCL 10 MG
TABLET ORAL
COMMUNITY
Start: 2018-02-01 | End: 2021-01-04

## 2020-12-30 RX ORDER — ASPIRIN 81 MG/1
81 TABLET, CHEWABLE ORAL DAILY
COMMUNITY
Start: 2015-02-11

## 2020-12-30 NOTE — TELEPHONE ENCOUNTER
EKG scanned in. He has Bigeminy pattern of PVCs. - extra beats that originate in the ventricles- not necessarily life threatening type of rhythm, can d/w cardiologist.

## 2021-01-01 ENCOUNTER — EXTERNAL RECORD (OUTPATIENT)
Dept: HEALTH INFORMATION MANAGEMENT | Facility: OTHER | Age: 46
End: 2021-01-01

## 2021-01-04 ENCOUNTER — V-VISIT (OUTPATIENT)
Dept: CARDIOLOGY | Age: 46
End: 2021-01-04

## 2021-01-04 VITALS
DIASTOLIC BLOOD PRESSURE: 67 MMHG | WEIGHT: 262 LBS | HEIGHT: 71 IN | HEART RATE: 68 BPM | SYSTOLIC BLOOD PRESSURE: 116 MMHG | BODY MASS INDEX: 36.68 KG/M2

## 2021-01-04 DIAGNOSIS — R73.03 PREDIABETES: ICD-10-CM

## 2021-01-04 DIAGNOSIS — I49.8 VENTRICULAR BIGEMINY: ICD-10-CM

## 2021-01-04 DIAGNOSIS — I73.9 PVD (PERIPHERAL VASCULAR DISEASE) (CMD): ICD-10-CM

## 2021-01-04 DIAGNOSIS — N52.9 ERECTILE DYSFUNCTION, UNSPECIFIED ERECTILE DYSFUNCTION TYPE: Primary | ICD-10-CM

## 2021-01-04 DIAGNOSIS — G47.33 OSA ON CPAP: ICD-10-CM

## 2021-01-04 DIAGNOSIS — I10 ESSENTIAL HYPERTENSION: ICD-10-CM

## 2021-01-04 DIAGNOSIS — R00.1 BRADYCARDIA: ICD-10-CM

## 2021-01-04 DIAGNOSIS — I49.3 PVC (PREMATURE VENTRICULAR CONTRACTION): ICD-10-CM

## 2021-01-04 DIAGNOSIS — E78.01 FAMILIAL HYPERCHOLESTEROLEMIA: ICD-10-CM

## 2021-01-04 PROCEDURE — 99205 OFFICE O/P NEW HI 60 MIN: CPT | Performed by: INTERNAL MEDICINE

## 2021-01-04 PROCEDURE — 3074F SYST BP LT 130 MM HG: CPT | Performed by: INTERNAL MEDICINE

## 2021-01-04 PROCEDURE — 3078F DIAST BP <80 MM HG: CPT | Performed by: INTERNAL MEDICINE

## 2021-01-04 SDOH — HEALTH STABILITY: PHYSICAL HEALTH: ON AVERAGE, HOW MANY DAYS PER WEEK DO YOU ENGAGE IN MODERATE TO STRENUOUS EXERCISE (LIKE A BRISK WALK)?: 3 DAYS

## 2021-01-04 SDOH — HEALTH STABILITY: PHYSICAL HEALTH: ON AVERAGE, HOW MANY MINUTES DO YOU ENGAGE IN EXERCISE AT THIS LEVEL?: 30 MIN

## 2021-01-04 ASSESSMENT — ENCOUNTER SYMPTOMS
BRUISES/BLEEDS EASILY: 0
HEMOPTYSIS: 0
WEIGHT LOSS: 0
ALLERGIC/IMMUNOLOGIC COMMENTS: NO NEW FOOD ALLERGIES
CHILLS: 0
FEVER: 0
SUSPICIOUS LESIONS: 0
WEIGHT GAIN: 0
COUGH: 0
HEMATOCHEZIA: 0

## 2021-01-08 ENCOUNTER — HOSPITAL ENCOUNTER (OUTPATIENT)
Dept: CV DIAGNOSTICS | Facility: HOSPITAL | Age: 46
Discharge: HOME | End: 2021-01-08
Attending: INTERNAL MEDICINE
Payer: COMMERCIAL

## 2021-01-08 DIAGNOSIS — E78.01 FAMILIAL HYPERCHOLESTEROLEMIA: ICD-10-CM

## 2021-01-08 DIAGNOSIS — I49.8 VENTRICULAR BIGEMINY: ICD-10-CM

## 2021-01-08 DIAGNOSIS — G47.33 OSA ON CPAP: ICD-10-CM

## 2021-01-08 DIAGNOSIS — I73.9 PVD (PERIPHERAL VASCULAR DISEASE) (HCC): ICD-10-CM

## 2021-01-08 DIAGNOSIS — R73.03 PREDIABETES: ICD-10-CM

## 2021-01-08 DIAGNOSIS — I10 HYPERTENSION, ESSENTIAL: ICD-10-CM

## 2021-01-08 DIAGNOSIS — I49.3 PVC (PREMATURE VENTRICULAR CONTRACTION): ICD-10-CM

## 2021-01-08 DIAGNOSIS — R00.1 BRADYCARDIA: ICD-10-CM

## 2021-01-08 DIAGNOSIS — Z99.89 OSA ON CPAP: ICD-10-CM

## 2021-01-08 PROCEDURE — 93306 TTE W/DOPPLER COMPLETE: CPT | Performed by: INTERNAL MEDICINE

## 2021-01-12 ENCOUNTER — ORDER TRANSCRIPTION (OUTPATIENT)
Dept: ADMINISTRATIVE | Facility: HOSPITAL | Age: 46
End: 2021-01-12

## 2021-01-12 ENCOUNTER — TELEPHONE (OUTPATIENT)
Dept: CARDIOLOGY | Age: 46
End: 2021-01-12

## 2021-01-12 DIAGNOSIS — Z11.59 ENCOUNTER FOR SCREENING FOR OTHER VIRAL DISEASES: ICD-10-CM

## 2021-01-12 DIAGNOSIS — Z01.818 PREOP EXAMINATION: Primary | ICD-10-CM

## 2021-01-13 ENCOUNTER — TELEPHONE (OUTPATIENT)
Dept: FAMILY MEDICINE CLINIC | Facility: CLINIC | Age: 46
End: 2021-01-13

## 2021-01-13 NOTE — TELEPHONE ENCOUNTER
Patient states he was seen 2 weeks ago, wanted ekg & labs done with Dr. Ramey Darrouzett, now can he re-start Zolpidem & cyclobenzaprine as discussed? Please advise.

## 2021-01-14 RX ORDER — ZOLPIDEM TARTRATE 10 MG/1
10 TABLET ORAL NIGHTLY PRN
Qty: 20 TABLET | Refills: 0 | Status: SHIPPED | OUTPATIENT
Start: 2021-01-14 | End: 2021-02-03

## 2021-01-14 NOTE — TELEPHONE ENCOUNTER
May have zolpidem. But should not use cyclobenzaprine along with the zolpidem as both can cause additive effect of sedation.      will send zolpidem

## 2021-01-14 NOTE — TELEPHONE ENCOUNTER
See Pawel message, pt asking why he was only given a 20 day refill and what he needs to to for refills.  Please advise

## 2021-02-02 NOTE — TELEPHONE ENCOUNTER
Requesting ZOLPIDEM TARTRATE 10 MG   LOV: 12/16/20   RTC:   Last Relevant Labs: 12/17/20  Filled: 1/14/21 # 20  with 0 refills    No future appointments.     ZOLPIDEM TARTRATE 01/14/2021 01/14/2021 10 20 tablet  20 KAYLI FERRER MD

## 2021-02-03 RX ORDER — ZOLPIDEM TARTRATE 10 MG/1
TABLET ORAL
Qty: 30 TABLET | Refills: 0 | Status: SHIPPED | OUTPATIENT
Start: 2021-02-03 | End: 2021-03-03

## 2021-02-09 DIAGNOSIS — Z23 NEED FOR VACCINATION: ICD-10-CM

## 2021-03-03 RX ORDER — ZOLPIDEM TARTRATE 10 MG/1
TABLET ORAL
Qty: 30 TABLET | Refills: 0 | Status: SHIPPED | OUTPATIENT
Start: 2021-03-03 | End: 2021-04-02

## 2021-03-03 NOTE — TELEPHONE ENCOUNTER
Requesting ZOLPIDEM TARTRATE 10 MG Oral Tab  LOV: 12/16/2020 w/Dr. Michael Kapoor for acute  RTC: Yearly Physical  Last Relevant Labs: 12/17/2020  Filled: 02/03/2021 #30 with 0 refills. No future appointments.     Medication pended and routed to PCP for approval

## 2021-04-02 ENCOUNTER — PATIENT MESSAGE (OUTPATIENT)
Dept: FAMILY MEDICINE CLINIC | Facility: CLINIC | Age: 46
End: 2021-04-02

## 2021-04-02 RX ORDER — ZOLPIDEM TARTRATE 10 MG/1
TABLET ORAL
Qty: 30 TABLET | Refills: 0 | Status: SHIPPED | OUTPATIENT
Start: 2021-04-02 | End: 2021-05-02

## 2021-04-02 NOTE — TELEPHONE ENCOUNTER
Requesting Zolpidem 10mg  LOV: 12/16/2020  RTC: prn  Last Relevant Labs: 12/17/2020  Filled: 3/3/21 #30 with 0 refills    No future appointments.     [place] 3/3/21 #30    Rx pended and routed for approval/denial

## 2021-04-02 NOTE — TELEPHONE ENCOUNTER
From: Maricarmen Zarate  To: Iona Chan MD  Sent: 4/2/2021 7:43 AM CDT  Subject: Non-Urgent Medical Question    Just wanted Dr Ronald Cooley to know I received both shots of the Phizer vaccine a month ago. Thanks.

## 2021-04-07 ENCOUNTER — PATIENT MESSAGE (OUTPATIENT)
Dept: FAMILY MEDICINE CLINIC | Facility: CLINIC | Age: 46
End: 2021-04-07

## 2021-04-07 NOTE — TELEPHONE ENCOUNTER
From: Kiera Overton  To: Favio Ledbetter MD  Sent: 4/7/2021 10:23 AM CDT  Subject: Other    To Jaja Mendoza,    I did give you the wrong date for the first Phizer shot. It was Feb 7 with the second Feb 28.  Through the [facility] at [facility]

## 2021-04-14 ENCOUNTER — PATIENT MESSAGE (OUTPATIENT)
Dept: FAMILY MEDICINE CLINIC | Facility: CLINIC | Age: 46
End: 2021-04-14

## 2021-04-14 NOTE — TELEPHONE ENCOUNTER
From: Edu Castillo  To: Charmayne Devoid, MD  Sent: 4/14/2021 10:35 AM CDT  Subject: Referral Request    Good Morning Dr Jay Bearden,     I have two questions.      First my allergies have been really bad and was wondering if you could recommend an ear, nose

## 2021-04-30 RX ORDER — ZOLPIDEM TARTRATE 10 MG/1
TABLET ORAL
Qty: 30 TABLET | Refills: 0 | Status: CANCELLED | OUTPATIENT
Start: 2021-04-30

## 2021-04-30 NOTE — TELEPHONE ENCOUNTER
Requesting Zolpidem 10mg  LOV: 12/16/2020  RTC: prn  Last Relevant Labs: 12/17/2020  Filled: 4/2/21 #30 with 0 refills    Future Appointments   Date Time Provider [facility] Jasmyn   5/19/2021  3:00 PM Truman Alba MD [facility] [address] [facility] [address]   5/19/2021  4

## 2021-05-02 RX ORDER — ZOLPIDEM TARTRATE 10 MG/1
TABLET ORAL
Qty: 30 TABLET | Refills: 0 | Status: SHIPPED | OUTPATIENT
Start: 2021-05-02 | End: 2021-05-30

## 2021-05-19 ENCOUNTER — OFFICE VISIT (OUTPATIENT)
Dept: FAMILY MEDICINE CLINIC | Facility: CLINIC | Age: 46
End: 2021-05-19
Payer: COMMERCIAL

## 2021-05-19 VITALS
RESPIRATION RATE: 16 BRPM | HEART RATE: 54 BPM | DIASTOLIC BLOOD PRESSURE: 84 MMHG | TEMPERATURE: 98 F | WEIGHT: 282 LBS | SYSTOLIC BLOOD PRESSURE: 126 MMHG | BODY MASS INDEX: 39.48 KG/M2 | HEIGHT: 71 IN

## 2021-05-19 DIAGNOSIS — Z00.00 ROUTINE ADULT HEALTH MAINTENANCE: Primary | ICD-10-CM

## 2021-05-19 DIAGNOSIS — L98.9 SKIN LESION: ICD-10-CM

## 2021-05-19 PROCEDURE — 3079F DIAST BP 80-89 MM HG: CPT | Performed by: FAMILY MEDICINE

## 2021-05-19 PROCEDURE — 3074F SYST BP LT 130 MM HG: CPT | Performed by: FAMILY MEDICINE

## 2021-05-19 PROCEDURE — 99396 PREV VISIT EST AGE 40-64: CPT | Performed by: FAMILY MEDICINE

## 2021-05-19 PROCEDURE — 3008F BODY MASS INDEX DOCD: CPT | Performed by: FAMILY MEDICINE

## 2021-05-20 NOTE — PROGRESS NOTES
Forest Blanchard is a 39year old male who presents for a complete physical exam.   HPI:   Patient presents with: Annual: routine physical, labs comleted 12/2020.  Seeing Dr. Jose Hunt @ 4:30pm this afternoon 05/19/2021 for bad allergies during winter  He ha BEDTIME AS NEEDED 30 tablet 0   • Lisinopril-hydroCHLOROthiazide 20-25 MG Oral Tab Take 1 tablet by mouth daily. 90 tablet 3   • Tadalafil 10 MG Oral Tab 1 tablet 1 hour prior to intercourse, max 10mg in 24 hours.  6 tablet 0   • aspirin 81 MG Oral Chew Tab BMI 39.33 kg/m²   Body mass index is 39.33 kg/m². GENERAL: NAD, pleasant, well developed, normal voice  SKIN: 3 mm hyperpigmented flat mole on the left cheek.   HENT: NCAT, EACs clear b/l, TMs normal b/l, nasal turbinates normal b/l, oropharynx with mmm/c the plan. The patient is asked to return for complete physical yearly. There are no Patient Instructions on file for this visit.   Orders Placed This Encounter      Derm Referral - In Network

## 2021-06-01 RX ORDER — ZOLPIDEM TARTRATE 10 MG/1
TABLET ORAL
Qty: 30 TABLET | Refills: 0 | Status: CANCELLED | OUTPATIENT
Start: 2021-06-01

## 2021-06-01 RX ORDER — ZOLPIDEM TARTRATE 10 MG/1
10 TABLET ORAL NIGHTLY PRN
Qty: 30 TABLET | Refills: 0 | Status: SHIPPED | OUTPATIENT
Start: 2021-06-01 | End: 2021-06-28

## 2021-06-01 NOTE — TELEPHONE ENCOUNTER
Requesting Zolpidem 10mg  LOV: 5/19/21 Physical  RTC: 1 year  Last Relevant Labs: 12/17/2020  Filled: 5/2/21 #30 with 0 refills    No future appointments.     [place] 5/2/21 #30    Rx pended and routed for approval/denial

## 2021-06-18 RX ORDER — LISINOPRIL AND HYDROCHLOROTHIAZIDE 25; 20 MG/1; MG/1
1 TABLET ORAL DAILY
Qty: 90 TABLET | Refills: 3 | Status: SHIPPED | OUTPATIENT
Start: 2021-06-18

## 2021-06-18 NOTE — TELEPHONE ENCOUNTER
Medication protocol passed:    Lisinopril-hydroCHLOROthiazide 20-25 MG Oral Tab          Sig: Take 1 tablet by mouth daily.     Disp:  90 tablet    Refills:  3    Start: 6/18/2021    Class: Normal    Non-formulary    Last ordered: 6 months ago by Yoav

## 2021-06-29 RX ORDER — ZOLPIDEM TARTRATE 10 MG/1
10 TABLET ORAL NIGHTLY PRN
Qty: 30 TABLET | Refills: 0 | Status: SHIPPED | OUTPATIENT
Start: 2021-06-29 | End: 2021-07-28

## 2021-06-29 NOTE — TELEPHONE ENCOUNTER
Requesting Zolpidem 10mg  LOV: 5/19/21 Physical  RTC: 1 year  Last Relevant Labs: 12/17/2020  Filled: 6/1/21 #30 with 0 refills    No future appointments.     [place] 6/1/21 #30    Rx pended and routed for approval/denial

## 2021-07-28 RX ORDER — ZOLPIDEM TARTRATE 10 MG/1
10 TABLET ORAL NIGHTLY PRN
Qty: 30 TABLET | Refills: 0 | Status: SHIPPED | OUTPATIENT
Start: 2021-07-28 | End: 2021-08-27

## 2021-08-30 NOTE — TELEPHONE ENCOUNTER
Requesting Zolpidem 10mg  LOV: 5/19/21 Physical  RTC: 1 year  Last Relevant Labs: 12/17/2020  Filled: 7/28/21 #30 with 0 refills    No future appointments.     Rx pended and routed for approval/denial

## 2021-08-31 RX ORDER — ZOLPIDEM TARTRATE 10 MG/1
10 TABLET ORAL NIGHTLY PRN
Qty: 30 TABLET | Refills: 0 | OUTPATIENT
Start: 2021-08-31

## 2021-08-31 RX ORDER — ZOLPIDEM TARTRATE 10 MG/1
10 TABLET ORAL NIGHTLY PRN
Qty: 30 TABLET | Refills: 0 | Status: SHIPPED | OUTPATIENT
Start: 2021-08-31 | End: 2021-09-27

## 2021-09-28 RX ORDER — ZOLPIDEM TARTRATE 10 MG/1
10 TABLET ORAL NIGHTLY PRN
Qty: 30 TABLET | Refills: 0 | Status: SHIPPED | OUTPATIENT
Start: 2021-09-28 | End: 2021-10-26

## 2021-09-28 NOTE — TELEPHONE ENCOUNTER
Requesting Zolpidem 10mg  LOV: 5/19/21 Physical  RTC: 1 year  Last Relevant Labs: 12/17/2020  Filled: 8/31/21 #30 with 0 refills    No future appointments.     Rx pended and routed for approval/denial

## 2021-10-27 RX ORDER — ZOLPIDEM TARTRATE 10 MG/1
10 TABLET ORAL NIGHTLY PRN
Qty: 30 TABLET | Refills: 0 | Status: SHIPPED | OUTPATIENT
Start: 2021-10-27 | End: 2021-11-22

## 2021-10-27 NOTE — TELEPHONE ENCOUNTER
Requesting Zolpidem 10mg  LOV: 5/19/21 Physical  RTC: 1 year  Last Relevant Labs: 12/17/2020  Filled: 9/28/21 #30 with 0 refills    No future appointments.     Rx pended and routed for approval/denial

## 2021-11-24 RX ORDER — ZOLPIDEM TARTRATE 10 MG/1
10 TABLET ORAL NIGHTLY PRN
Qty: 30 TABLET | Refills: 0 | Status: SHIPPED | OUTPATIENT
Start: 2021-11-24 | End: 2021-12-22

## 2021-11-24 NOTE — TELEPHONE ENCOUNTER
Patient here for IV venofer. IV access gained and infusion started. No problems noted with prior infusion. IV access removed and patient discharged to waiting room per self.    Requesting Zolpidem 10mg  LOV: 5/19/21 Physical  RTC: 1 year  Last Relevant Labs: 12/17/2020  Filled: 10/27/21 #30 with 0 refills    No future appointments.     Rx pended and routed for approval/denial

## 2021-12-26 RX ORDER — ZOLPIDEM TARTRATE 10 MG/1
10 TABLET ORAL NIGHTLY PRN
Qty: 30 TABLET | Refills: 0 | Status: SHIPPED | OUTPATIENT
Start: 2021-12-26 | End: 2022-01-24

## 2022-01-25 RX ORDER — ZOLPIDEM TARTRATE 10 MG/1
10 TABLET ORAL NIGHTLY PRN
Qty: 30 TABLET | Refills: 0 | Status: SHIPPED | OUTPATIENT
Start: 2022-01-25

## 2022-01-25 NOTE — TELEPHONE ENCOUNTER
Requesting Zolpidem 10mg  LOV: 5/19/21 Physical  RTC: 1 year  Last Relevant Labs: 12/17/2020  Filled: 12/26/21 #30 with 0 refills    Future Appointments   Date Time Provider Radha Vines   2/7/2022  3:20 PM Filiberto Baker MD [facility] [address] [facility] [address]

## 2022-02-07 ENCOUNTER — OFFICE VISIT (OUTPATIENT)
Dept: FAMILY MEDICINE CLINIC | Facility: CLINIC | Age: 47
End: 2022-02-07
Payer: COMMERCIAL

## 2022-02-07 VITALS
TEMPERATURE: 97 F | SYSTOLIC BLOOD PRESSURE: 130 MMHG | HEART RATE: 85 BPM | DIASTOLIC BLOOD PRESSURE: 80 MMHG | RESPIRATION RATE: 16 BRPM | OXYGEN SATURATION: 98 % | BODY MASS INDEX: 39.41 KG/M2 | HEIGHT: 71 IN | WEIGHT: 281.5 LBS

## 2022-02-07 DIAGNOSIS — Z00.00 ROUTINE ADULT HEALTH MAINTENANCE: Primary | ICD-10-CM

## 2022-02-07 PROCEDURE — 3075F SYST BP GE 130 - 139MM HG: CPT | Performed by: FAMILY MEDICINE

## 2022-02-07 PROCEDURE — 3079F DIAST BP 80-89 MM HG: CPT | Performed by: FAMILY MEDICINE

## 2022-02-07 PROCEDURE — 99396 PREV VISIT EST AGE 40-64: CPT | Performed by: FAMILY MEDICINE

## 2022-02-07 PROCEDURE — 3008F BODY MASS INDEX DOCD: CPT | Performed by: FAMILY MEDICINE

## 2022-02-22 DIAGNOSIS — F51.01 PRIMARY INSOMNIA: ICD-10-CM

## 2022-02-22 DIAGNOSIS — Z00.00 ROUTINE ADULT HEALTH MAINTENANCE: Primary | ICD-10-CM

## 2022-02-22 RX ORDER — ZOLPIDEM TARTRATE 10 MG/1
10 TABLET ORAL NIGHTLY PRN
Qty: 30 TABLET | Refills: 0 | Status: SHIPPED | OUTPATIENT
Start: 2022-02-22 | End: 2022-03-24

## 2022-02-22 NOTE — TELEPHONE ENCOUNTER
Requesting Zolpidem 10mg  LOV: 2/7/22 Physical  RTC: 1 year  Last Relevant Labs: 12/17/2020  Filled: 1/25/22 #30 with 0 refills    No future appointments.     [place] 1/25/22 #30    Rx pended and routed for approval/denial

## 2022-03-25 NOTE — TELEPHONE ENCOUNTER
Requesting Zolpidem 10mg  LOV: 2/7/22 Physical  RTC: 1 year  Last Relevant Labs: 1/26/22  Filled: 2/22/22 #30 with 0 refills    No future appointments.     Rx pended and routed for approval/denial

## 2022-03-27 RX ORDER — ZOLPIDEM TARTRATE 10 MG/1
10 TABLET ORAL NIGHTLY PRN
Qty: 30 TABLET | Refills: 0 | Status: SHIPPED | OUTPATIENT
Start: 2022-03-27

## 2022-04-25 RX ORDER — ZOLPIDEM TARTRATE 10 MG/1
10 TABLET ORAL NIGHTLY PRN
Qty: 30 TABLET | Refills: 0 | Status: SHIPPED | OUTPATIENT
Start: 2022-04-25

## 2022-05-24 DIAGNOSIS — F51.01 PRIMARY INSOMNIA: ICD-10-CM

## 2022-05-25 NOTE — TELEPHONE ENCOUNTER
Requesting Zolpidem 10mg  LOV: 2/7/22 Physical  RTC: 1 year  Last Relevant Labs: 1/26/22  Filled: 4/25/22 #30 with 0 refills    No future appointments.     Rx pended and routed for approval/denial

## 2022-05-26 RX ORDER — ZOLPIDEM TARTRATE 10 MG/1
10 TABLET ORAL NIGHTLY PRN
Qty: 30 TABLET | Refills: 0 | Status: SHIPPED | OUTPATIENT
Start: 2022-05-26

## 2022-06-06 RX ORDER — LISINOPRIL AND HYDROCHLOROTHIAZIDE 25; 20 MG/1; MG/1
1 TABLET ORAL DAILY
Qty: 90 TABLET | Refills: 2 | Status: SHIPPED | OUTPATIENT
Start: 2022-06-06

## 2022-06-24 DIAGNOSIS — F51.01 PRIMARY INSOMNIA: ICD-10-CM

## 2022-06-27 DIAGNOSIS — F51.01 PRIMARY INSOMNIA: ICD-10-CM

## 2022-06-27 RX ORDER — ZOLPIDEM TARTRATE 10 MG/1
10 TABLET ORAL NIGHTLY PRN
Qty: 30 TABLET | Refills: 0 | Status: SHIPPED | OUTPATIENT
Start: 2022-06-27 | End: 2022-07-26

## 2022-06-27 NOTE — TELEPHONE ENCOUNTER
Requesting Zolpidem 10mg  LOV: 2/7/22 Physical  RTC:  1 year  Last Relevant Labs: 1/26/22  Filled: 5/26/22 #30 with 0 refills    No future appointments.     [place] , last [place] 5/26/22 #30    Rx pended and routed for approval/denial

## 2022-06-28 NOTE — TELEPHONE ENCOUNTER
If no family history of colon cancer, he can do FIT test for colon cancer screening. Please do that asap.

## 2022-07-05 RX ORDER — ZOLPIDEM TARTRATE 10 MG/1
10 TABLET ORAL NIGHTLY PRN
Qty: 30 TABLET | Refills: 0 | OUTPATIENT
Start: 2022-07-05

## 2022-07-05 NOTE — TELEPHONE ENCOUNTER
Request denied as duplicate - Rx sent to pharmacy on 6/27/22    zolpidem 10 MG Oral Tab 30 tablet 0 6/27/2022     Sig - Route:  Take 1 tablet (10 mg total) by mouth nightly as needed for Sleep. - Oral    Sent to pharmacy as: Zolpidem Tartrate 10 MG Oral Tablet (Ambien)    E-Prescribing Status: Receipt confirmed by pharmacy (6/27/2022 11:59 PM CDT)

## 2022-07-18 NOTE — TELEPHONE ENCOUNTER
Requested Prescriptions     Pending Prescriptions Disp Refills   • LISINOPRIL-HYDROCHLOROTHIAZIDE 20-25 MG Oral Tab [Pharmacy Med Name: LISINOPRIL-HCTZ 20/25MG TABLETS] 30 tablet 0     Sig: TAKE 1 TABLET BY MOUTH DAILY, PATIENT NEEDS AN APPOINTMENT   • CYC [Negative] : Heme/Lymph [see HPI] : see HPI

## 2022-07-26 DIAGNOSIS — F51.01 PRIMARY INSOMNIA: ICD-10-CM

## 2022-07-27 RX ORDER — ZOLPIDEM TARTRATE 10 MG/1
10 TABLET ORAL NIGHTLY PRN
Qty: 30 TABLET | Refills: 0 | Status: SHIPPED | OUTPATIENT
Start: 2022-07-27

## 2022-07-27 NOTE — TELEPHONE ENCOUNTER
Requesting Zolpidem 10mg  LOV: 2/7/22 Physical  RTC: 1 year  Last Relevant Labs: 3/1/22  Filled: 6/27/22 #30 with 0 refills    No future appointments.     Rx pended and routed for approval/denial

## 2022-08-24 DIAGNOSIS — F51.01 PRIMARY INSOMNIA: ICD-10-CM

## 2022-08-26 RX ORDER — ZOLPIDEM TARTRATE 10 MG/1
10 TABLET ORAL NIGHTLY PRN
Qty: 30 TABLET | Refills: 0 | Status: SHIPPED | OUTPATIENT
Start: 2022-08-26

## 2022-08-26 NOTE — TELEPHONE ENCOUNTER
Requesting Zolpidem 10mg  LOV: 2/7/22 Physical  RTC: 1 year  Last Relevant Labs: 1/26/22  Filled: 7/27/22 #30 with 0 refills    No future appointments.     Rx pended and routed for approval/denial

## 2022-09-24 DIAGNOSIS — F51.01 PRIMARY INSOMNIA: ICD-10-CM

## 2022-09-26 RX ORDER — ZOLPIDEM TARTRATE 10 MG/1
10 TABLET ORAL NIGHTLY PRN
Qty: 30 TABLET | Refills: 0 | Status: SHIPPED | OUTPATIENT
Start: 2022-09-26

## 2022-09-26 NOTE — TELEPHONE ENCOUNTER
Requesting Zolpidem 10mg  LOV: 2/7/22 Physical  RTC: 1 year  Last Relevant Labs: 1/26/22  Filled: 8/26/22 #30 with 0 refills    No future appointments.      Rx pended and routed for approval/denial

## 2022-09-28 ENCOUNTER — TELEPHONE (OUTPATIENT)
Dept: FAMILY MEDICINE CLINIC | Facility: CLINIC | Age: 47
End: 2022-09-28

## 2022-09-28 NOTE — TELEPHONE ENCOUNTER
Patient states his CPAP machine was recalled. He has had sleep apnea for 6-7 years. It was recalled about a year ago but they finally provided information for him. He uses [place] [facility] dream machine and was told to have his provider call one of these numbers with a new RX for a new machine. Please call either [phone] or [phone].

## 2022-10-25 DIAGNOSIS — F51.01 PRIMARY INSOMNIA: ICD-10-CM

## 2022-10-25 RX ORDER — ZOLPIDEM TARTRATE 10 MG/1
10 TABLET ORAL NIGHTLY PRN
Qty: 30 TABLET | Refills: 0 | Status: SHIPPED | OUTPATIENT
Start: 2022-10-25

## 2022-10-25 NOTE — TELEPHONE ENCOUNTER
Requesting Zolpidem 10mg  LOV: 2/7/22 Physical  RTC: 1 year  Last Relevant Labs: 1/26/22  Filled: 9/26/22 #30 with 0 refills    No future appointments.     [place] , last [place] 9/26/22 #30    Rx pended and routed for approval/denial

## 2022-11-28 DIAGNOSIS — F51.01 PRIMARY INSOMNIA: ICD-10-CM

## 2022-11-28 RX ORDER — ZOLPIDEM TARTRATE 10 MG/1
10 TABLET ORAL NIGHTLY PRN
Qty: 30 TABLET | Refills: 0 | Status: SHIPPED | OUTPATIENT
Start: 2022-11-28

## 2022-11-28 NOTE — TELEPHONE ENCOUNTER
Requesting Zolpidem 10mg  LOV: 2/7/22 Physical  RTC: prn  Last Relevant Labs: 1/26/22  Filled: 10/25/22 #30 with 0 refills    No future appointments.     Rx pended and routed for approval/denial

## 2022-12-26 DIAGNOSIS — F51.01 PRIMARY INSOMNIA: ICD-10-CM

## 2022-12-27 RX ORDER — ZOLPIDEM TARTRATE 10 MG/1
10 TABLET ORAL NIGHTLY PRN
Qty: 30 TABLET | Refills: 0 | Status: SHIPPED | OUTPATIENT
Start: 2022-12-27

## 2022-12-27 RX ORDER — TADALAFIL 10 MG/1
TABLET ORAL
Qty: 6 TABLET | Refills: 0 | Status: SHIPPED | OUTPATIENT
Start: 2022-12-27

## 2022-12-27 NOTE — TELEPHONE ENCOUNTER
Requesting Tadalafil 10mg  LOV: 2/7/22 Physical  RTC: 1 year  Last Relevant Labs: 1/26/22  Filled: 1/29/2020 #6 with 0 refills    Requesting Zolpidem 10mg  LOV: 2/7/22 Physical  RTC: 1 year  Last Relevant Labs: 1/26/22  Filled: 10/25/22 #30 with 0 refills      No future appointments.     RXs pended and routed for approval/denial

## 2023-01-16 ENCOUNTER — TELEPHONE (OUTPATIENT)
Dept: FAMILY MEDICINE CLINIC | Facility: CLINIC | Age: 48
End: 2023-01-16

## 2023-01-16 NOTE — TELEPHONE ENCOUNTER
Received order for from [address] [facility] for [employer] equipment fpor CPAP. Per Dr. Larry Greco we do not have a recent sleep study on file. PVPower message sent to patient re:above.

## 2023-02-08 ENCOUNTER — OFFICE VISIT (OUTPATIENT)
Dept: FAMILY MEDICINE CLINIC | Facility: CLINIC | Age: 48
End: 2023-02-08
Payer: COMMERCIAL

## 2023-02-08 VITALS
OXYGEN SATURATION: 98 % | DIASTOLIC BLOOD PRESSURE: 82 MMHG | WEIGHT: 287.38 LBS | HEIGHT: 71 IN | HEART RATE: 87 BPM | TEMPERATURE: 97 F | SYSTOLIC BLOOD PRESSURE: 136 MMHG | RESPIRATION RATE: 16 BRPM | BODY MASS INDEX: 40.23 KG/M2

## 2023-02-08 DIAGNOSIS — Z00.00 ROUTINE ADULT HEALTH MAINTENANCE: Primary | ICD-10-CM

## 2023-02-08 DIAGNOSIS — E78.5 HYPERLIPIDEMIA, UNSPECIFIED HYPERLIPIDEMIA TYPE: ICD-10-CM

## 2023-02-08 DIAGNOSIS — F51.01 PRIMARY INSOMNIA: ICD-10-CM

## 2023-02-08 DIAGNOSIS — E66.09 OBESITY DUE TO EXCESS CALORIES WITHOUT SERIOUS COMORBIDITY, UNSPECIFIED CLASSIFICATION: ICD-10-CM

## 2023-02-08 DIAGNOSIS — I10 ESSENTIAL HYPERTENSION: ICD-10-CM

## 2023-02-08 PROCEDURE — 3079F DIAST BP 80-89 MM HG: CPT | Performed by: FAMILY MEDICINE

## 2023-02-08 PROCEDURE — 3008F BODY MASS INDEX DOCD: CPT | Performed by: FAMILY MEDICINE

## 2023-02-08 PROCEDURE — 99213 OFFICE O/P EST LOW 20 MIN: CPT | Performed by: FAMILY MEDICINE

## 2023-02-08 PROCEDURE — 3075F SYST BP GE 130 - 139MM HG: CPT | Performed by: FAMILY MEDICINE

## 2023-02-08 PROCEDURE — 99396 PREV VISIT EST AGE 40-64: CPT | Performed by: FAMILY MEDICINE

## 2023-02-08 RX ORDER — ZOLPIDEM TARTRATE 10 MG/1
10 TABLET ORAL NIGHTLY PRN
Qty: 30 TABLET | Refills: 0 | Status: SHIPPED | OUTPATIENT
Start: 2023-02-08

## 2023-02-08 RX ORDER — LISINOPRIL AND HYDROCHLOROTHIAZIDE 25; 20 MG/1; MG/1
1 TABLET ORAL DAILY
Qty: 90 TABLET | Refills: 1 | Status: SHIPPED | OUTPATIENT
Start: 2023-02-08

## 2023-02-08 RX ORDER — AZELASTINE 1 MG/ML
2 SPRAY, METERED NASAL 2 TIMES DAILY
Qty: 1 EACH | Refills: 5 | Status: SHIPPED | OUTPATIENT
Start: 2023-02-08

## 2023-02-08 RX ORDER — TADALAFIL 10 MG/1
TABLET ORAL
Qty: 6 TABLET | Refills: 1 | Status: SHIPPED | OUTPATIENT
Start: 2023-02-08

## 2023-02-24 DIAGNOSIS — F51.01 PRIMARY INSOMNIA: ICD-10-CM

## 2023-02-27 RX ORDER — ZOLPIDEM TARTRATE 10 MG/1
10 TABLET ORAL NIGHTLY PRN
Qty: 30 TABLET | Refills: 0 | Status: SHIPPED | OUTPATIENT
Start: 2023-02-27

## 2023-03-25 DIAGNOSIS — F51.01 PRIMARY INSOMNIA: ICD-10-CM

## 2023-03-28 DIAGNOSIS — F51.01 PRIMARY INSOMNIA: ICD-10-CM

## 2023-03-28 NOTE — TELEPHONE ENCOUNTER
Requesting Tadalafil 10mg  LOV: 2/8/23 Physical  RTC: 1 year  Last Relevant Labs: 2/8/23  Filled: 2/8/23 #6 with 1 refills    Requesting Zolpidem 10mg  LOV: 2/8/23 Physical  RTC: 1 year  Last Relevant Labs: 2/8/23  Filled: 2/27/23 #30 with 0 refills    No future appointments.     RXs pended and routed for approval/denial

## 2023-03-28 NOTE — TELEPHONE ENCOUNTER
Requesting zolpidem 10mg   LOV: 2/8/23  RTC: As needed  Last Relevant Labs: none  Filled: 2/27/23 #30 with 0 refills    Per [place] PNP last dispensed 2/27/2023 #30    Medication pending. No protocol. No future appointments.

## 2023-03-29 RX ORDER — ZOLPIDEM TARTRATE 10 MG/1
10 TABLET ORAL NIGHTLY PRN
Qty: 30 TABLET | Refills: 0 | Status: SHIPPED | OUTPATIENT
Start: 2023-03-29

## 2023-03-29 RX ORDER — TADALAFIL 10 MG/1
TABLET ORAL
Qty: 6 TABLET | Refills: 1 | Status: SHIPPED | OUTPATIENT
Start: 2023-03-29

## 2023-04-26 DIAGNOSIS — F51.01 PRIMARY INSOMNIA: ICD-10-CM

## 2023-04-26 NOTE — TELEPHONE ENCOUNTER
Requesting Zolpidem 10mg  LOV: 2/8/23 Physical  RTC: 1 year  Last Relevant Labs: 1/24/23  Filled: 3/29/23 #30 with 0 refills    No future appointments.     Rx pended and routed for approval/denial

## 2023-04-27 RX ORDER — ZOLPIDEM TARTRATE 10 MG/1
10 TABLET ORAL NIGHTLY PRN
Qty: 30 TABLET | Refills: 0 | Status: SHIPPED | OUTPATIENT
Start: 2023-04-27

## 2023-05-01 ENCOUNTER — TELEPHONE (OUTPATIENT)
Dept: FAMILY MEDICINE CLINIC | Facility: CLINIC | Age: 48
End: 2023-05-01

## 2023-05-01 NOTE — TELEPHONE ENCOUNTER
Emi calling to inform Dr. Candido Davis of a medication issue. Pt was given a refill of Zolpidem 5 MG instead of the 10 MG. The mistake was corrected and the Pt had only taken one dose at the 5 MG. Pt has been given the refill with the correct dose now. No need for call back. Just LAUREN.     [facility] #[id number] - [address] [facility], [address] [place] [address] AT [facility] OF [address] [facility] [address] [place] [address], [phone], [place] [address]   Phone: [phone] Fax: [phone]

## 2023-05-26 DIAGNOSIS — F51.01 PRIMARY INSOMNIA: ICD-10-CM

## 2023-05-30 DIAGNOSIS — F51.01 PRIMARY INSOMNIA: ICD-10-CM

## 2023-05-30 NOTE — TELEPHONE ENCOUNTER
Requesting Zolpidem 10mg  LOV: 2/8/23 Physical  RTC: 1 year  Last Relevant Labs: 2/8/23  Filled: 4/27/23 #30 with 0 refills    No future appointments.     Per [place] , last dispensed 4/27/23 #30    Rx pended and routed for approval/denial

## 2023-05-31 RX ORDER — ZOLPIDEM TARTRATE 10 MG/1
10 TABLET ORAL NIGHTLY PRN
Qty: 30 TABLET | Refills: 0 | Status: SHIPPED | OUTPATIENT
Start: 2023-05-31

## 2023-06-02 RX ORDER — ZOLPIDEM TARTRATE 10 MG/1
10 TABLET ORAL NIGHTLY PRN
Qty: 30 TABLET | Refills: 0 | Status: SHIPPED | OUTPATIENT
Start: 2023-06-02

## 2023-06-02 NOTE — TELEPHONE ENCOUNTER
Patient is requesting a refill on: Zolpidem 10 mg # 30  Last LOV: 2/8/23  Last Refill: 4/27/23      Non- protocol Medication  RX pended and routed to provider for approval

## 2023-06-19 ENCOUNTER — OFFICE VISIT (OUTPATIENT)
Dept: FAMILY MEDICINE CLINIC | Facility: CLINIC | Age: 48
End: 2023-06-19
Payer: COMMERCIAL

## 2023-06-19 VITALS
TEMPERATURE: 100 F | SYSTOLIC BLOOD PRESSURE: 122 MMHG | DIASTOLIC BLOOD PRESSURE: 88 MMHG | BODY MASS INDEX: 39.2 KG/M2 | OXYGEN SATURATION: 95 % | HEART RATE: 87 BPM | HEIGHT: 71 IN | WEIGHT: 280 LBS | RESPIRATION RATE: 14 BRPM

## 2023-06-19 DIAGNOSIS — E66.09 CLASS 2 OBESITY DUE TO EXCESS CALORIES WITH BODY MASS INDEX (BMI) OF 39.0 TO 39.9 IN ADULT, UNSPECIFIED WHETHER SERIOUS COMORBIDITY PRESENT: ICD-10-CM

## 2023-06-19 DIAGNOSIS — F51.01 PRIMARY INSOMNIA: ICD-10-CM

## 2023-06-19 DIAGNOSIS — N52.9 ERECTILE DYSFUNCTION, UNSPECIFIED ERECTILE DYSFUNCTION TYPE: ICD-10-CM

## 2023-06-19 DIAGNOSIS — I10 ESSENTIAL HYPERTENSION: ICD-10-CM

## 2023-06-19 DIAGNOSIS — Z12.11 COLON CANCER SCREENING: Primary | ICD-10-CM

## 2023-06-19 PROCEDURE — 3008F BODY MASS INDEX DOCD: CPT | Performed by: FAMILY MEDICINE

## 2023-06-19 PROCEDURE — 99215 OFFICE O/P EST HI 40 MIN: CPT | Performed by: FAMILY MEDICINE

## 2023-06-19 PROCEDURE — 3079F DIAST BP 80-89 MM HG: CPT | Performed by: FAMILY MEDICINE

## 2023-06-19 PROCEDURE — 3074F SYST BP LT 130 MM HG: CPT | Performed by: FAMILY MEDICINE

## 2023-06-19 RX ORDER — LISINOPRIL AND HYDROCHLOROTHIAZIDE 25; 20 MG/1; MG/1
1 TABLET ORAL DAILY
Qty: 90 TABLET | Refills: 1 | Status: SHIPPED | OUTPATIENT
Start: 2023-06-19

## 2023-06-19 RX ORDER — TADALAFIL 10 MG/1
TABLET ORAL
Qty: 6 TABLET | Refills: 1 | Status: SHIPPED | OUTPATIENT
Start: 2023-06-19

## 2023-06-19 RX ORDER — CYCLOBENZAPRINE HCL 10 MG
10 TABLET ORAL 3 TIMES DAILY PRN
COMMUNITY
Start: 2022-10-16

## 2023-06-19 RX ORDER — CETIRIZINE HYDROCHLORIDE 10 MG/1
TABLET ORAL DAILY
COMMUNITY

## 2023-06-19 RX ORDER — ZOLPIDEM TARTRATE 10 MG/1
10 TABLET ORAL NIGHTLY PRN
Qty: 30 TABLET | Refills: 0 | Status: SHIPPED | OUTPATIENT
Start: 2023-06-27

## 2023-07-24 ENCOUNTER — TELEPHONE (OUTPATIENT)
Dept: FAMILY MEDICINE CLINIC | Facility: CLINIC | Age: 48
End: 2023-07-24

## 2023-07-24 DIAGNOSIS — F51.01 PRIMARY INSOMNIA: ICD-10-CM

## 2023-07-25 NOTE — TELEPHONE ENCOUNTER
Requesting Zolpidem 10mg  LOV: 6/19/23  RTC: 1 month  Last Relevant Labs: 1/24/23  Filled: 6/27/23 #30 with 0 refills    Future Appointments   Date Time Provider Radha Vines   8/2/2023 11:20 AM Willi Cagle MD [facility] [address] [facility] [address]     Per [place] , last dispensed 6/27/23 #30    Rx pended and routed for approval/denial

## 2023-07-27 RX ORDER — ZOLPIDEM TARTRATE 10 MG/1
10 TABLET ORAL NIGHTLY PRN
Qty: 30 TABLET | Refills: 0 | Status: SHIPPED | OUTPATIENT
Start: 2023-07-27

## 2023-08-02 ENCOUNTER — OFFICE VISIT (OUTPATIENT)
Dept: FAMILY MEDICINE CLINIC | Facility: CLINIC | Age: 48
End: 2023-08-02
Payer: COMMERCIAL

## 2023-08-02 VITALS
HEART RATE: 83 BPM | OXYGEN SATURATION: 97 % | TEMPERATURE: 98 F | RESPIRATION RATE: 14 BRPM | DIASTOLIC BLOOD PRESSURE: 88 MMHG | SYSTOLIC BLOOD PRESSURE: 128 MMHG | BODY MASS INDEX: 36.96 KG/M2 | HEIGHT: 71 IN | WEIGHT: 264 LBS

## 2023-08-02 DIAGNOSIS — E66.09 OBESITY DUE TO EXCESS CALORIES WITHOUT SERIOUS COMORBIDITY, UNSPECIFIED CLASSIFICATION: Primary | ICD-10-CM

## 2023-08-02 PROCEDURE — 99214 OFFICE O/P EST MOD 30 MIN: CPT | Performed by: FAMILY MEDICINE

## 2023-08-02 PROCEDURE — 3079F DIAST BP 80-89 MM HG: CPT | Performed by: FAMILY MEDICINE

## 2023-08-02 PROCEDURE — 3008F BODY MASS INDEX DOCD: CPT | Performed by: FAMILY MEDICINE

## 2023-08-02 PROCEDURE — 3074F SYST BP LT 130 MM HG: CPT | Performed by: FAMILY MEDICINE

## 2023-08-02 RX ORDER — SEMAGLUTIDE 0.25 MG/.5ML
0.5 INJECTION, SOLUTION SUBCUTANEOUS
COMMUNITY
Start: 2023-07-07

## 2023-08-02 RX ORDER — ONDANSETRON 4 MG/1
4 TABLET, ORALLY DISINTEGRATING ORAL EVERY 8 HOURS PRN
Qty: 9 TABLET | Refills: 0 | Status: SHIPPED | OUTPATIENT
Start: 2023-08-02

## 2023-08-02 RX ORDER — LISINOPRIL AND HYDROCHLOROTHIAZIDE 25; 20 MG/1; MG/1
1 TABLET ORAL DAILY
Qty: 90 TABLET | Refills: 1 | Status: SHIPPED | OUTPATIENT
Start: 2023-08-02

## 2023-08-23 DIAGNOSIS — F51.01 PRIMARY INSOMNIA: ICD-10-CM

## 2023-08-24 RX ORDER — ZOLPIDEM TARTRATE 10 MG/1
10 TABLET ORAL NIGHTLY PRN
Qty: 30 TABLET | Refills: 0 | Status: SHIPPED | OUTPATIENT
Start: 2023-08-24

## 2023-08-24 NOTE — TELEPHONE ENCOUNTER
Requesting Zolpidem 10mg  LOV: 8/2/23  RTC: not noted  Last Relevant Labs: 1/24/23  Filled: 7/27/23 #30 with 0 refills    Future Appointments   Date Time Provider Radha Jasmyn   9/5/2023  3:40 PM Jamie Barclay MD [facility] [address] [facility] [address]     Per Jacqueline Gu[place] , last dispensed 7/27/23 #30    Rx pended and routed for approval/denial

## 2023-09-05 ENCOUNTER — OFFICE VISIT (OUTPATIENT)
Dept: FAMILY MEDICINE CLINIC | Facility: CLINIC | Age: 48
End: 2023-09-05
Payer: COMMERCIAL

## 2023-09-05 VITALS
DIASTOLIC BLOOD PRESSURE: 80 MMHG | OXYGEN SATURATION: 98 % | SYSTOLIC BLOOD PRESSURE: 134 MMHG | WEIGHT: 258 LBS | RESPIRATION RATE: 16 BRPM | HEART RATE: 87 BPM | TEMPERATURE: 97 F | HEIGHT: 71 IN | BODY MASS INDEX: 36.12 KG/M2

## 2023-09-05 DIAGNOSIS — I10 PRIMARY HYPERTENSION: ICD-10-CM

## 2023-09-05 DIAGNOSIS — E78.00 HYPERCHOLESTEREMIA: ICD-10-CM

## 2023-09-05 DIAGNOSIS — R11.0 NAUSEA: ICD-10-CM

## 2023-09-05 DIAGNOSIS — E66.01 CLASS 2 SEVERE OBESITY DUE TO EXCESS CALORIES WITH SERIOUS COMORBIDITY AND BODY MASS INDEX (BMI) OF 35.0 TO 35.9 IN ADULT: Primary | ICD-10-CM

## 2023-09-05 DIAGNOSIS — R73.03 PREDIABETES: ICD-10-CM

## 2023-09-05 PROCEDURE — 3079F DIAST BP 80-89 MM HG: CPT | Performed by: STUDENT IN AN ORGANIZED HEALTH CARE EDUCATION/TRAINING PROGRAM

## 2023-09-05 PROCEDURE — 3075F SYST BP GE 130 - 139MM HG: CPT | Performed by: STUDENT IN AN ORGANIZED HEALTH CARE EDUCATION/TRAINING PROGRAM

## 2023-09-05 PROCEDURE — 99214 OFFICE O/P EST MOD 30 MIN: CPT | Performed by: STUDENT IN AN ORGANIZED HEALTH CARE EDUCATION/TRAINING PROGRAM

## 2023-09-05 PROCEDURE — 3008F BODY MASS INDEX DOCD: CPT | Performed by: STUDENT IN AN ORGANIZED HEALTH CARE EDUCATION/TRAINING PROGRAM

## 2023-09-05 RX ORDER — ONDANSETRON 4 MG/1
4 TABLET, ORALLY DISINTEGRATING ORAL EVERY 8 HOURS PRN
Qty: 10 TABLET | Refills: 0 | Status: SHIPPED | OUTPATIENT
Start: 2023-09-05

## 2023-09-07 ENCOUNTER — TELEPHONE (OUTPATIENT)
Dept: FAMILY MEDICINE CLINIC | Facility: CLINIC | Age: 48
End: 2023-09-07

## 2023-09-07 DIAGNOSIS — R73.03 PREDIABETES: ICD-10-CM

## 2023-09-07 DIAGNOSIS — E66.01 CLASS 2 SEVERE OBESITY DUE TO EXCESS CALORIES WITH SERIOUS COMORBIDITY AND BODY MASS INDEX (BMI) OF 35.0 TO 35.9 IN ADULT: ICD-10-CM

## 2023-09-07 DIAGNOSIS — I10 PRIMARY HYPERTENSION: ICD-10-CM

## 2023-09-07 DIAGNOSIS — E78.00 HYPERCHOLESTEREMIA: ICD-10-CM

## 2023-09-07 NOTE — TELEPHONE ENCOUNTER
Pt requesting this medication semaglutide-weight management 1 MG/0.5ML Subcutaneous Solution Auto-injector be sent to   [facility] #[id number] - [address] [facility], [address] AT [facility] [address] [facility] [address] [place] [address], [phone], [address] [facility] [address] Phone: [phone] Fax: [phone]     [facility]quin Myers is out of stock.

## 2023-09-22 DIAGNOSIS — F51.01 PRIMARY INSOMNIA: ICD-10-CM

## 2023-09-25 RX ORDER — ZOLPIDEM TARTRATE 10 MG/1
10 TABLET ORAL NIGHTLY PRN
Qty: 30 TABLET | Refills: 0 | Status: SHIPPED | OUTPATIENT
Start: 2023-09-25

## 2023-09-25 NOTE — TELEPHONE ENCOUNTER
Requesting Zolpidem 10mg  LOV: 9/5/23  RTC: 1 month  Last Relevant Labs: 1/24/23  Filled: 8/24/23 #30 with 0 refills    Future Appointments   Date Time Provider Radha Jasmyn   10/2/2023  3:00 PM Nagi Lewis MD [facility] [address] [facility] [address]     Per [place] Correa , last dispensed 8/24/23 #30    Rx pended and routed for approval/denial

## 2023-10-02 ENCOUNTER — TELEMEDICINE (OUTPATIENT)
Dept: FAMILY MEDICINE CLINIC | Facility: CLINIC | Age: 48
End: 2023-10-02
Payer: COMMERCIAL

## 2023-10-02 VITALS
SYSTOLIC BLOOD PRESSURE: 126 MMHG | WEIGHT: 245 LBS | DIASTOLIC BLOOD PRESSURE: 85 MMHG | BODY MASS INDEX: 34 KG/M2 | HEART RATE: 90 BPM

## 2023-10-02 DIAGNOSIS — I10 PRIMARY HYPERTENSION: ICD-10-CM

## 2023-10-02 DIAGNOSIS — R73.03 PREDIABETES: ICD-10-CM

## 2023-10-02 DIAGNOSIS — E78.00 HYPERCHOLESTEREMIA: ICD-10-CM

## 2023-10-02 DIAGNOSIS — E66.01 CLASS 2 SEVERE OBESITY DUE TO EXCESS CALORIES WITH SERIOUS COMORBIDITY AND BODY MASS INDEX (BMI) OF 35.0 TO 35.9 IN ADULT: Primary | ICD-10-CM

## 2023-10-02 DIAGNOSIS — E66.01 CLASS 2 SEVERE OBESITY DUE TO EXCESS CALORIES WITH SERIOUS COMORBIDITY AND BODY MASS INDEX (BMI) OF 35.0 TO 35.9 IN ADULT: ICD-10-CM

## 2023-10-02 DIAGNOSIS — R11.0 NAUSEA: ICD-10-CM

## 2023-10-02 RX ORDER — SEMAGLUTIDE 1 MG/.5ML
1 INJECTION, SOLUTION SUBCUTANEOUS WEEKLY
Qty: 2 ML | Refills: 0 | OUTPATIENT
Start: 2023-10-02

## 2023-10-02 RX ORDER — ONDANSETRON 4 MG/1
4 TABLET, ORALLY DISINTEGRATING ORAL EVERY 8 HOURS PRN
Qty: 30 TABLET | Refills: 0 | Status: SHIPPED | OUTPATIENT
Start: 2023-10-02

## 2023-10-23 DIAGNOSIS — F51.01 PRIMARY INSOMNIA: ICD-10-CM

## 2023-10-24 RX ORDER — ZOLPIDEM TARTRATE 10 MG/1
10 TABLET ORAL NIGHTLY PRN
Qty: 30 TABLET | Refills: 0 | Status: SHIPPED | OUTPATIENT
Start: 2023-10-24

## 2023-10-24 RX ORDER — TADALAFIL 10 MG/1
TABLET ORAL
Qty: 6 TABLET | Refills: 1 | Status: SHIPPED | OUTPATIENT
Start: 2023-10-24

## 2023-10-24 NOTE — TELEPHONE ENCOUNTER
Requesting Tadafil 10mg  Filled: 6/19/23 #6 with 1 refills    Requesting Zolpidem 10mg  Filled: 9/25/23 #30 with 0 refills    LOV: 10/2/23 VV  RTC: 3 months  Last Relevant Labs: 1/24/23    No future appointments.     Per [place] , Zolpidem last dispensed 9/25/23 #30    RXs pended and routed for approval/denial

## 2023-10-28 DIAGNOSIS — R11.0 NAUSEA: ICD-10-CM

## 2023-10-30 RX ORDER — ONDANSETRON 4 MG/1
4 TABLET, ORALLY DISINTEGRATING ORAL EVERY 8 HOURS PRN
Qty: 30 TABLET | Refills: 0 | Status: SHIPPED | OUTPATIENT
Start: 2023-10-30

## 2023-10-30 NOTE — TELEPHONE ENCOUNTER
Requested Renewals     ondansetron 4 MG Oral Tablet Dispersible         Sig: Take 1 tablet (4 mg total) by mouth every 8 (eight) hours as needed for Nausea. Disp: 30 tablet    Refills: 0    Start: 10/28/2023    Class: Normal    Non-formulary For: Nausea        No future appointments. LOV: 10/2/23 telemedicine  RTC: 3 months    -medication pended for review.

## 2023-11-11 NOTE — PROGRESS NOTES
HPI:   Vik Ruvalcaba is a 40year old male that presents for Patient presents with:  Imm/Inj: Patient is requesting flu vaccine.   Refill Request: Ambien, Lisinopril and Flonase        Past medical, surgical, family and social history reviewed in detail Throat:  No tonsillar erythema or exudate. Mouth:  No oral lesions or ulcerations, good dentition. NECK: Supple, no cervical LAD, no thyromegaly. SKIN: No rashes, no skin lesion, no bruising, good turgor.   HEART:  Regular rate and rhythm, no murmu English

## 2023-11-20 DIAGNOSIS — F51.01 PRIMARY INSOMNIA: ICD-10-CM

## 2023-11-27 DIAGNOSIS — F51.01 PRIMARY INSOMNIA: ICD-10-CM

## 2023-11-28 RX ORDER — TADALAFIL 10 MG/1
TABLET ORAL
Qty: 6 TABLET | Refills: 1 | Status: SHIPPED | OUTPATIENT
Start: 2023-11-28

## 2023-11-28 RX ORDER — ZOLPIDEM TARTRATE 10 MG/1
10 TABLET ORAL NIGHTLY PRN
Qty: 30 TABLET | Refills: 0 | Status: SHIPPED | OUTPATIENT
Start: 2023-11-28

## 2023-11-30 RX ORDER — TADALAFIL 10 MG/1
TABLET ORAL
Qty: 6 TABLET | Refills: 1 | OUTPATIENT
Start: 2023-11-30

## 2023-11-30 RX ORDER — ZOLPIDEM TARTRATE 10 MG/1
10 TABLET ORAL NIGHTLY PRN
Qty: 30 TABLET | Refills: 0 | OUTPATIENT
Start: 2023-11-30

## 2023-12-21 DIAGNOSIS — F51.01 PRIMARY INSOMNIA: ICD-10-CM

## 2023-12-21 DIAGNOSIS — R11.0 NAUSEA: ICD-10-CM

## 2023-12-22 RX ORDER — ONDANSETRON 4 MG/1
4 TABLET, ORALLY DISINTEGRATING ORAL EVERY 8 HOURS PRN
Qty: 15 TABLET | Refills: 0 | Status: SHIPPED | OUTPATIENT
Start: 2023-12-22

## 2023-12-22 NOTE — TELEPHONE ENCOUNTER
Patient is requesting a refill on: Zolpidem 10mg # 30  Last LOV:  9/5/23  Last Refill: 11/28/23  Last relevant labs:  RTC:    Non- protocol Medication  RX pended and routed to provider for approval

## 2023-12-22 NOTE — TELEPHONE ENCOUNTER
Patient is requesting a refill on: Ondansetron 4 mg # 15  Last LOV: VV 10/2/23  Last Refill: 10/30/23 # 30  Last relevant labs:  RTC:    Non- protocol Medication  RX pended and routed to provider for approval

## 2023-12-26 DIAGNOSIS — I10 PRIMARY HYPERTENSION: ICD-10-CM

## 2023-12-26 DIAGNOSIS — E66.01 CLASS 2 SEVERE OBESITY DUE TO EXCESS CALORIES WITH SERIOUS COMORBIDITY AND BODY MASS INDEX (BMI) OF 35.0 TO 35.9 IN ADULT  (HCC): ICD-10-CM

## 2023-12-26 DIAGNOSIS — E78.00 HYPERCHOLESTEREMIA: ICD-10-CM

## 2023-12-26 DIAGNOSIS — R73.03 PREDIABETES: ICD-10-CM

## 2023-12-28 RX ORDER — ZOLPIDEM TARTRATE 10 MG/1
10 TABLET ORAL NIGHTLY PRN
Qty: 30 TABLET | Refills: 0 | Status: SHIPPED | OUTPATIENT
Start: 2023-12-28

## 2024-01-01 ENCOUNTER — PATIENT MESSAGE (OUTPATIENT)
Dept: FAMILY MEDICINE CLINIC | Facility: CLINIC | Age: 49
End: 2024-01-01

## 2024-01-02 RX ORDER — SEMAGLUTIDE 1.7 MG/.75ML
INJECTION, SOLUTION SUBCUTANEOUS
Qty: 3 ML | Refills: 0 | Status: SHIPPED | OUTPATIENT
Start: 2024-01-02

## 2024-01-02 NOTE — TELEPHONE ENCOUNTER
Medication Quantity Refills Start End   semaglutide-weight management (WEGOVY) 1.7 MG/0.75ML Subcutaneous Solution Auto-injector 3 mL 0 1/2/2024 --   Sig:   INJECT 1.7 MG UNDER THE SKIN ONCE WEEKLY     Route:   (none)     Order #:   [id number]

## 2024-01-02 NOTE — TELEPHONE ENCOUNTER
From: Bernabe Crespo  To: Dior Granados  Sent: 1/1/2024 3:26 PM CST  Subject: My Perception     Oralia Granados    I scheduled an appointment with you on January 25 to review all my bloodwork from the wellness screening I get from work.     Also I tried to renew my wegovy prescription but it does not show up on my list of medications for some reason. Are you able to send my [facility] an approval to get another months supply it ran out last week.       Thanks!

## 2024-01-02 NOTE — TELEPHONE ENCOUNTER
Requesting Wegovy 1.7mg weekly  Last OV: 10/2/23 VV  RTC: 3 months  Last Rx'd 10/2/23 #3mL with 2 refills    No future appointments.    Non-protocol med:  Rx pended and routed for approval/denial

## 2024-01-25 ENCOUNTER — OFFICE VISIT (OUTPATIENT)
Dept: FAMILY MEDICINE CLINIC | Facility: CLINIC | Age: 49
End: 2024-01-25
Payer: COMMERCIAL

## 2024-01-25 VITALS
HEART RATE: 72 BPM | HEIGHT: 71 IN | DIASTOLIC BLOOD PRESSURE: 60 MMHG | BODY MASS INDEX: 32.65 KG/M2 | RESPIRATION RATE: 16 BRPM | SYSTOLIC BLOOD PRESSURE: 130 MMHG | TEMPERATURE: 97 F | WEIGHT: 233.25 LBS

## 2024-01-25 DIAGNOSIS — E66.01 CLASS 2 SEVERE OBESITY DUE TO EXCESS CALORIES WITH SERIOUS COMORBIDITY AND BODY MASS INDEX (BMI) OF 35.0 TO 35.9 IN ADULT  (HCC): Primary | ICD-10-CM

## 2024-01-25 DIAGNOSIS — I10 PRIMARY HYPERTENSION: ICD-10-CM

## 2024-01-25 DIAGNOSIS — E78.00 HYPERCHOLESTEREMIA: ICD-10-CM

## 2024-01-25 DIAGNOSIS — S39.012D STRAIN OF LUMBAR REGION, SUBSEQUENT ENCOUNTER: ICD-10-CM

## 2024-01-25 DIAGNOSIS — F51.01 PRIMARY INSOMNIA: ICD-10-CM

## 2024-01-25 DIAGNOSIS — N52.9 ERECTILE DYSFUNCTION, UNSPECIFIED ERECTILE DYSFUNCTION TYPE: ICD-10-CM

## 2024-01-25 DIAGNOSIS — R73.03 PREDIABETES: ICD-10-CM

## 2024-01-25 DIAGNOSIS — R11.0 NAUSEA: ICD-10-CM

## 2024-01-25 PROCEDURE — 99214 OFFICE O/P EST MOD 30 MIN: CPT | Performed by: STUDENT IN AN ORGANIZED HEALTH CARE EDUCATION/TRAINING PROGRAM

## 2024-01-25 PROCEDURE — 3008F BODY MASS INDEX DOCD: CPT | Performed by: STUDENT IN AN ORGANIZED HEALTH CARE EDUCATION/TRAINING PROGRAM

## 2024-01-25 PROCEDURE — 3078F DIAST BP <80 MM HG: CPT | Performed by: STUDENT IN AN ORGANIZED HEALTH CARE EDUCATION/TRAINING PROGRAM

## 2024-01-25 PROCEDURE — 3075F SYST BP GE 130 - 139MM HG: CPT | Performed by: STUDENT IN AN ORGANIZED HEALTH CARE EDUCATION/TRAINING PROGRAM

## 2024-01-25 RX ORDER — ONDANSETRON 4 MG/1
4 TABLET, ORALLY DISINTEGRATING ORAL EVERY 8 HOURS PRN
Qty: 15 TABLET | Refills: 1 | Status: SHIPPED | OUTPATIENT
Start: 2024-01-25

## 2024-01-25 RX ORDER — ZOLPIDEM TARTRATE 10 MG/1
10 TABLET ORAL NIGHTLY PRN
Qty: 30 TABLET | Refills: 1 | Status: SHIPPED | OUTPATIENT
Start: 2024-01-25

## 2024-01-25 RX ORDER — CYCLOBENZAPRINE HCL 10 MG
10 TABLET ORAL 3 TIMES DAILY PRN
Qty: 30 TABLET | Refills: 2 | Status: SHIPPED | OUTPATIENT
Start: 2024-01-25

## 2024-01-25 RX ORDER — TADALAFIL 10 MG/1
TABLET ORAL
Qty: 6 TABLET | Refills: 1 | Status: SHIPPED | OUTPATIENT
Start: 2024-01-25

## 2024-01-25 RX ORDER — LISINOPRIL AND HYDROCHLOROTHIAZIDE 25; 20 MG/1; MG/1
1 TABLET ORAL DAILY
Qty: 90 TABLET | Refills: 1 | Status: SHIPPED | OUTPATIENT
Start: 2024-01-25

## 2024-01-25 NOTE — PROGRESS NOTES
Subjective:      Chief Complaint   Patient presents with    Lab Results     Here to discuss labs done though work on 1/18/24    Weight Check     Would like to discuss increase on Wegovy     HISTORY OF PRESENT ILLNESS  HPI  HPI obtained per patient report.  Bernabe Crespo is a pleasant 48 year old male presenting for follow-up.   He has been doing well his his new dose of wegovy. The nausea that he experiences within several hours after a dose of wegovy is resolved with a dose of zofran. He has been finding it easier to follow a healthier dietary plan as well. He has lost 25lbs in 5 months but his weight has been at a plateau for about 3 months, so he would like to discuss increasing his wegovy dose.   He requests medication refills.   He has periodic flare-ups of L LBP since an injury about a year ago. Massage and flexeril have been helpful to facilitate resolution of symptoms.    PAST PATIENT HISTORY  Past Medical History:   Diagnosis Date    Allergic rhinitis     Anxiety     HTN (hypertension)     Hypercholesteremia     Obesity When i needed bigger pants    Prediabetes     Sleep apnea 2017     History reviewed. No pertinent surgical history.    CURRENT MEDICATIONS  Outpatient Medications Marked as Taking for the 1/25/24 encounter (Office Visit) with Dior Granados MD   Medication Sig Dispense Refill    semaglutide-weight management 2.4 MG/0.75ML Subcutaneous Solution Auto-injector Inject 0.75 mL (2.4 mg total) into the skin once a week. 3 mL 5    ondansetron 4 MG Oral Tablet Dispersible Take 1 tablet (4 mg total) by mouth every 8 (eight) hours as needed for Nausea. 15 tablet 1    lisinopril-hydroCHLOROthiazide 20-25 MG Oral Tab Take 1 tablet by mouth daily. 90 tablet 1    zolpidem 10 MG Oral Tab Take 1 tablet (10 mg total) by mouth nightly as needed for Sleep. 30 tablet 1    Tadalafil 10 MG Oral Tab Take 1 tablet by mouth 1 hour prior to intercourse. Do not exceed 1 tablet in a 24 hour period 6 tablet 1     cyclobenzaprine 10 MG Oral Tab Take 1 tablet (10 mg total) by mouth 3 (three) times daily as needed for Muscle spasms. 30 tablet 2    hydrocortisone-pramoxine perianal 1-1 % External Foam Apply upto 4 times a day as needed for hemorrhoidal pain/discomfort. 10 g 1    cetirizine 10 MG Oral Tab Take by mouth daily.      azelastine 0.1 % Nasal Solution 2 sprays by Nasal route 2 (two) times daily. 1 each 5    Fluticasone Propionate 50 MCG/ACT Nasal Suspension 2 sprays by Each Nare route daily. 1 Bottle 5       HEALTH MAINTENANCE  Immunization History   Administered Date(s) Administered    Covid-19 Vaccine Pfizer 30 mcg/0.3 ml 02/07/2021, 02/28/2021, 10/11/2021    FLULAVAL 6 months & older 0.5 ml Prefilled syringe (83446) 09/19/2019    FLUZONE 6 months and older PFS 0.5 ml (25641) 09/19/2019, 10/01/2020, 09/29/2021    Influenza 10/23/2015, 10/03/2016, 09/28/2020, 10/09/2022, 09/09/2023    TDAP 07/27/2013, 06/15/2015       ALLERGIES AND DRUG REACTIONS  Allergies   Allergen Reactions    Ropinirole OTHER (SEE COMMENTS) and FEVER       Family History   Problem Relation Age of Onset    Cancer Mother         AML.    Survivor     Social History     Socioeconomic History    Marital status:    Tobacco Use    Smoking status: Never    Smokeless tobacco: Never   Substance and Sexual Activity    Alcohol use: Not Currently     Comment: no drink in 2 years    Drug use: No   Other Topics Concern    Caffeine Concern No    Exercise No    Seat Belt No    Special Diet No    Stress Concern Yes    Weight Concern Yes       Review of Systems   All other systems reviewed and are negative.         Objective:      /60   Pulse 72   Temp 97.3 °F (36.3 °C) (Temporal)   Resp 16   Ht 5' 11\" (1.803 m)   Wt 233 lb 4 oz (105.8 kg)   BMI 32.53 kg/m²   Body mass index is 32.53 kg/m².    Physical Exam  Vitals reviewed.   Constitutional:       General: He is not in acute distress.     Appearance: He is not ill-appearing, toxic-appearing or  diaphoretic.   HENT:      Head: Normocephalic and atraumatic.   Eyes:      General: No scleral icterus.        Right eye: No discharge.         Left eye: No discharge.      Extraocular Movements: Extraocular movements intact.      Conjunctiva/sclera: Conjunctivae normal.   Cardiovascular:      Rate and Rhythm: Normal rate and regular rhythm.      Heart sounds: Normal heart sounds.   Pulmonary:      Effort: Pulmonary effort is normal.      Breath sounds: Normal breath sounds.   Abdominal:      General: Bowel sounds are normal. There is no distension.      Palpations: Abdomen is soft. There is no mass.      Tenderness: There is no abdominal tenderness. There is no guarding or rebound.      Hernia: No hernia is present.   Musculoskeletal:      Cervical back: Neck supple.   Skin:     General: Skin is warm and dry.      Coloration: Skin is not jaundiced or pale.      Findings: No bruising, erythema or rash.   Neurological:      Mental Status: He is alert and oriented to person, place, and time.            Assessment and Plan:      1. Class 2 severe obesity due to excess calories with serious comorbidity and body mass index (BMI) of 35.0 to 35.9 in adult  (HCC) (Primary)  -     Semaglutide-Weight Management; Inject 0.75 mL (2.4 mg total) into the skin once a week.  Dispense: 3 mL; Refill: 5  2. Body mass index 35.0-35.9, adult  -     Semaglutide-Weight Management; Inject 0.75 mL (2.4 mg total) into the skin once a week.  Dispense: 3 mL; Refill: 5  3. Prediabetes  -     Semaglutide-Weight Management; Inject 0.75 mL (2.4 mg total) into the skin once a week.  Dispense: 3 mL; Refill: 5  4. Hypercholesteremia  -     Semaglutide-Weight Management; Inject 0.75 mL (2.4 mg total) into the skin once a week.  Dispense: 3 mL; Refill: 5  5. Primary hypertension  -     Semaglutide-Weight Management; Inject 0.75 mL (2.4 mg total) into the skin once a week.  Dispense: 3 mL; Refill: 5  -     Lisinopril-hydroCHLOROthiazide; Take 1 tablet  by mouth daily.  Dispense: 90 tablet; Refill: 1  6. Nausea  -     Ondansetron; Take 1 tablet (4 mg total) by mouth every 8 (eight) hours as needed for Nausea.  Dispense: 15 tablet; Refill: 1  7. Primary insomnia  -     Zolpidem Tartrate; Take 1 tablet (10 mg total) by mouth nightly as needed for Sleep.  Dispense: 30 tablet; Refill: 1  8. Strain of lumbar region, subsequent encounter  -     Cyclobenzaprine HCl; Take 1 tablet (10 mg total) by mouth 3 (three) times daily as needed for Muscle spasms.  Dispense: 30 tablet; Refill: 2  9. Erectile dysfunction, unspecified erectile dysfunction type  -     Tadalafil; Take 1 tablet by mouth 1 hour prior to intercourse. Do not exceed 1 tablet in a 24 hour period  Dispense: 6 tablet; Refill: 1    Return in about 6 months (around 7/25/2024).    BMI 32  - successful on wegovy 1.7mg weekly, but recently has noted a plateau  - CBC, CMP 1/18/24 WNL  - lipid panel 1/18/24 only remarkable for mildly elevated cholesterol  - a shared decision was made to increase dose to 2.4mg weekly  - may continue zofran as needed for nausea    HTN  - well controlled  - continue current regimen    Back strain  - recommended rest, local ice/heat application, gentle stretching, tylenol or ibuprofen as needed, and flexeril as needed    Patient verbalized understanding of assessment and recommendations. All questions and concerns were addressed.    Electronically signed by Dior Granados MD

## 2024-03-07 ENCOUNTER — PATIENT MESSAGE (OUTPATIENT)
Dept: FAMILY MEDICINE CLINIC | Facility: CLINIC | Age: 49
End: 2024-03-07

## 2024-03-07 NOTE — TELEPHONE ENCOUNTER
From: Bernabe Crespo  To: Dior Granados  Sent: 3/7/2024 1:38 PM CST  Subject: Kidney Stones    Oralia Granados,     Last night I woke up in pain on my right side. It’s odd because I’ve never had anything like that before. I took 3 Alieves and a hot shower and was able to get back to sleep. Today I’ve had the urge to pee constantly though out the day but very little comes out. The pain in my side has returned but definitely not as bad as last night.     Wanted to know if there is a prescription or over the counter medication I should try to help with the discomfort? Thanks in advance for your help with this issue.

## 2024-03-21 DIAGNOSIS — R11.0 NAUSEA: ICD-10-CM

## 2024-03-22 RX ORDER — ONDANSETRON 4 MG/1
4 TABLET, ORALLY DISINTEGRATING ORAL EVERY 8 HOURS PRN
Qty: 15 TABLET | Refills: 1 | Status: SHIPPED | OUTPATIENT
Start: 2024-03-22

## 2024-03-22 NOTE — TELEPHONE ENCOUNTER
Requested Renewals     Name from pharmacy: ONDANSETRON ODT 4MG TABLETS         Will file in chart as: ONDANSETRON 4 MG Oral Tablet Dispersible    Sig: DISSOLVE 1 TABLET(4 MG) ON THE TONGUE EVERY 8 HOURS AS NEEDED FOR NAUSEA    Disp: 15 tablet    Refills: 1 (Pharmacy requested: Not specified)    Start: 3/21/2024    Class: Normal    Non-formulary For: Nausea          No future appointments.  LOV: 1/25/24  RTC: 6 months    -medication pended for review.

## 2024-04-19 DIAGNOSIS — F51.01 PRIMARY INSOMNIA: ICD-10-CM

## 2024-04-22 RX ORDER — ZOLPIDEM TARTRATE 10 MG/1
10 TABLET ORAL NIGHTLY PRN
Qty: 30 TABLET | Refills: 2 | Status: SHIPPED | OUTPATIENT
Start: 2024-04-22

## 2024-04-22 NOTE — TELEPHONE ENCOUNTER
Patient is requesting an refill on Zolpidem 10 mg # 30    LOV- 1/25/2024    Last Refill- 3/21/2024

## 2024-04-23 RX ORDER — ZOLPIDEM TARTRATE 10 MG/1
10 TABLET ORAL NIGHTLY PRN
Qty: 30 TABLET | Refills: 1 | OUTPATIENT
Start: 2024-04-23

## 2024-04-23 NOTE — TELEPHONE ENCOUNTER
Duplicate request - Rx sent 4/22/24    zolpidem 10 MG Oral Tab 30 tablet 2 4/22/2024 --    Sig - Route: TAKE 1 TABLET(10 MG) BY MOUTH EVERY NIGHT AS NEEDED FOR SLEEP - Oral    Sent to pharmacy as: Zolpidem Tartrate 10 MG Oral Tablet (Ambien)    E-Prescribing Status: Receipt confirmed by pharmacy (4/22/2024 11:58 AM CDT)    Renewals    Renewal provider: Jakub Mccann MD           [facility] #[id number] [place], [place] - [address] AT [facility] [address], [phone], [phone]

## 2024-05-19 DIAGNOSIS — F51.01 PRIMARY INSOMNIA: ICD-10-CM

## 2024-05-20 RX ORDER — ZOLPIDEM TARTRATE 10 MG/1
10 TABLET ORAL NIGHTLY PRN
Qty: 30 TABLET | Refills: 2 | OUTPATIENT
Start: 2024-05-20

## 2024-05-20 NOTE — TELEPHONE ENCOUNTER
Requesting Zolpidem 10mg  Last OV: 1/25/24  RTC: 6 months  Last Rx'd 4/22/24  #30 with 2 refills    No future appointments.    Per [place] , last dispensed 4/22/24 #30    Last Rx was sent on 4/22/24 with 2 refills.  MyChart sent advising pt to reach out to pharmacy for refills.

## 2024-06-18 ENCOUNTER — TELEMEDICINE (OUTPATIENT)
Dept: FAMILY MEDICINE CLINIC | Facility: CLINIC | Age: 49
End: 2024-06-18

## 2024-06-18 ENCOUNTER — PATIENT MESSAGE (OUTPATIENT)
Dept: FAMILY MEDICINE CLINIC | Facility: CLINIC | Age: 49
End: 2024-06-18

## 2024-06-18 DIAGNOSIS — R73.03 PREDIABETES: ICD-10-CM

## 2024-06-18 DIAGNOSIS — L50.9 URTICARIA: Primary | ICD-10-CM

## 2024-06-18 DIAGNOSIS — E66.01 CLASS 2 SEVERE OBESITY DUE TO EXCESS CALORIES WITH SERIOUS COMORBIDITY AND BODY MASS INDEX (BMI) OF 35.0 TO 35.9 IN ADULT (HCC): ICD-10-CM

## 2024-06-18 DIAGNOSIS — R11.0 NAUSEA: ICD-10-CM

## 2024-06-18 PROCEDURE — 99214 OFFICE O/P EST MOD 30 MIN: CPT | Performed by: STUDENT IN AN ORGANIZED HEALTH CARE EDUCATION/TRAINING PROGRAM

## 2024-06-18 RX ORDER — ONDANSETRON 4 MG/1
4 TABLET, FILM COATED ORAL EVERY 8 HOURS PRN
Qty: 20 TABLET | Refills: 2 | Status: SHIPPED | OUTPATIENT
Start: 2024-06-18

## 2024-06-18 NOTE — PROGRESS NOTES
Subjective:      No chief complaint on file.    HISTORY OF PRESENT ILLNESS  HPI  HPI obtained per patient report.  Bernabe Crespo is a pleasant 49 year old male presenting virtually to discuss a new rash.    He developed an itchy rash on B/L sides of his torso and legs within a few hours after injecting a Wegovy dose for the past 2 weeks. The symptoms resolved within 1-3 days with benadryl and topical hydrocortisone lotion. He denies any other new symptoms including SOB, rash or swelling around the mouth or throat. No new OTC or prescription medications recently.     PAST PATIENT HISTORY  Past Medical History:    Allergic rhinitis    Anxiety    HTN (hypertension)    Hypercholesteremia    Obesity    Prediabetes    Sleep apnea     No past surgical history on file.    CURRENT MEDICATIONS  Outpatient Medications Marked as Taking for the 6/18/24 encounter (Telemedicine) with Dior Granados MD   Medication Sig Dispense Refill    semaglutide-weight management 1.7 MG/0.75ML Subcutaneous Solution Auto-injector Inject 0.75 mL (1.7 mg total) into the skin once a week for 14 doses. 1.5 mL 1    ondansetron (ZOFRAN) 4 mg tablet Take 1 tablet (4 mg total) by mouth every 8 (eight) hours as needed for Nausea. 20 tablet 2    zolpidem 10 MG Oral Tab TAKE 1 TABLET(10 MG) BY MOUTH EVERY NIGHT AS NEEDED FOR SLEEP 30 tablet 2    ondansetron 4 MG Oral Tablet Dispersible Place 1 tablet (4 mg total) under the tongue every 8 (eight) hours as needed for Nausea. 15 tablet 1    lisinopril-hydroCHLOROthiazide 20-25 MG Oral Tab Take 1 tablet by mouth daily. 90 tablet 1    Tadalafil 10 MG Oral Tab Take 1 tablet by mouth 1 hour prior to intercourse. Do not exceed 1 tablet in a 24 hour period 6 tablet 1       HEALTH MAINTENANCE  Immunization History   Administered Date(s) Administered    Covid-19 Vaccine Pfizer 30 mcg/0.3 ml 02/07/2021, 02/28/2021, 10/11/2021    FLULAVAL 6 months & older 0.5 ml Prefilled syringe (84966) 09/19/2019    FLUZONE 6 months  and older PFS 0.5 ml (24502) 09/19/2019, 10/01/2020, 09/29/2021    Influenza 10/23/2015, 10/03/2016, 09/28/2020, 10/09/2022, 09/09/2023    TDAP 07/27/2013, 06/15/2015       ALLERGIES AND DRUG REACTIONS  Allergies   Allergen Reactions    Ropinirole OTHER (SEE COMMENTS) and FEVER       Family History   Problem Relation Age of Onset    Cancer Mother         AML.    Survivor     Social History     Socioeconomic History    Marital status:    Tobacco Use    Smoking status: Never    Smokeless tobacco: Never   Substance and Sexual Activity    Alcohol use: Not Currently     Comment: no drink in 2 years    Drug use: No   Other Topics Concern    Caffeine Concern No    Exercise No    Seat Belt No    Special Diet No    Stress Concern Yes    Weight Concern Yes     Social Determinants of Health     Physical Activity: Insufficiently Active (1/4/2021)    Received [facility] [employer] [employer]    Exercise Vital Sign     Days of Exercise per Week: 3 days     Minutes of Exercise per Session: 30 min    Received from [facility], [facility]    Social Connections    Received from [facility], [facility]    Housing Stability       Review of Systems   Skin:  Positive for rash.   All other systems reviewed and are negative.         Objective:      There were no vitals taken for this visit.  There is no height or weight on file to calculate BMI.    Physical Exam  Constitutional:       General: He is not in acute distress.     Appearance: He is not ill-appearing or toxic-appearing.   Pulmonary:      Effort: Pulmonary effort is normal.   Musculoskeletal:      Cervical back: Neck supple.   Skin:     Comments: Rash is currently resolved, but he was able to send 2 photos of his most recent rash via My Chart. Appearance of rash on the photos are consistent with urticaria   Neurological:      Mental Status: He is alert and oriented to person,  place, and time.   Psychiatric:         Mood and Affect: Mood normal.            Assessment and Plan:      1. Urticaria (Primary)  2. Class 2 severe obesity due to excess calories with serious comorbidity and body mass index (BMI) of 35.0 to 35.9 in adult (HCC)  -     Semaglutide-Weight Management; Inject 0.75 mL (1.7 mg total) into the skin once a week for 14 doses.  Dispense: 1.5 mL; Refill: 1  3. Body mass index 35.0-35.9, adult  -     Semaglutide-Weight Management; Inject 0.75 mL (1.7 mg total) into the skin once a week for 14 doses.  Dispense: 1.5 mL; Refill: 1  4. Prediabetes  -     Semaglutide-Weight Management; Inject 0.75 mL (1.7 mg total) into the skin once a week for 14 doses.  Dispense: 1.5 mL; Refill: 1  5. Nausea  -     Ondansetron HCl; Take 1 tablet (4 mg total) by mouth every 8 (eight) hours as needed for Nausea.  Dispense: 20 tablet; Refill: 2    No follow-ups on file.    - delayed allergic reaction to wegovy  - currently resolved with benadryl and topical hydrocortisone  - recommended discontinuation of wegovy. We discussed the R/B/A of alternative medications including zepbound, phentermine, and metformin. He prefers to try a lower dose of wegovy first before complete discontinuation of wegovy, as he had been tolerating this medication well without any issues for over 1 year prior to the past 2 weeks and has seen significant success in his weight management journey with this medication. We discussed that, while lowering the dose will unlikely reduce the risk of an allergic reaction to wegovy, it is also unlikely to be dangerous to his health to try a lower dose for 1-2 weeks to assess his response. We reviewed the symptoms/signs of anaphylaxis for which to monitor and to seek immediate medical attention if he develops any of these symptoms/signs    Patient verbalized understanding of assessment and recommendations. All questions and concerns were addressed.    Telehealth appointment with video and  audio was scheduled and completed with the patient's informed consent. Telehealth appointment took place in context of CDC social distancing guidelines during the Covid-19 pandemic.      Electronically signed by Dior Granados MD

## 2024-06-21 ENCOUNTER — PATIENT MESSAGE (OUTPATIENT)
Dept: FAMILY MEDICINE CLINIC | Facility: CLINIC | Age: 49
End: 2024-06-21

## 2024-06-21 NOTE — TELEPHONE ENCOUNTER
From: Bernabe Crespo  To: Dior Granados  Sent: 6/21/2024 1:15 PM CDT  Subject: Tuesdays computer visit    Hi Dr Granados    Good news, I figured out what caused the rash I had on my stomach and sides. It turns out it had nothing to do with the wegovy. It was from the chemicals being off in my hot tub which caused the break out. Bromine level was way too high and the areas affected align with the jets.     We were going to go down a level on the wegovy for my next refill to see if that helped. Can you cancel that 1.75 refill and instead replace it again with the normal 2.4 level that I’ve been on?      Sorry for the confusion and wasting you time. Thanks!

## 2024-07-09 ENCOUNTER — PATIENT MESSAGE (OUTPATIENT)
Dept: FAMILY MEDICINE CLINIC | Facility: CLINIC | Age: 49
End: 2024-07-09

## 2024-07-09 NOTE — TELEPHONE ENCOUNTER
From: Bernabe Crespo  To: Dior Granados  Sent: 7/9/2024 4:37 PM CDT  Subject: Magnesium Supplement       Hi Dr Granados    I’ve been reading a lot about the benefits of taking a magnesium supplement. Was wondering if taking a 250mg supplement would have a negative interaction with the meds I currently take?     Thanks!

## 2024-07-17 ENCOUNTER — PATIENT MESSAGE (OUTPATIENT)
Dept: FAMILY MEDICINE CLINIC | Facility: CLINIC | Age: 49
End: 2024-07-17

## 2024-07-17 DIAGNOSIS — F51.01 PRIMARY INSOMNIA: ICD-10-CM

## 2024-07-18 NOTE — TELEPHONE ENCOUNTER
From: Bernabe Crespo  To: Dior Granados  Sent: 7/17/2024 5:11 PM CDT  Subject: Med refills      I need a refill on my 2.4 wegovy. We were going to switch to 1.7 but it turned out my reaction was from the chemicals in my hot tub     So if you could please refill my wegovy 2.4 and my Ambian please.       Thank you

## 2024-07-18 NOTE — TELEPHONE ENCOUNTER
Requesting Zolpidem 10mg  Last OV: 6/18/24 Telemedicine  RTC: prn  Last Rx'd 4/22/24 #30 with 2 refills    No future appointments.    Per [place] , last dispensed 6/18/24 #30    Controlled med:  Rx pended and routed for approval/denial

## 2024-07-19 RX ORDER — ZOLPIDEM TARTRATE 10 MG/1
10 TABLET ORAL NIGHTLY PRN
Qty: 30 TABLET | Refills: 2 | Status: SHIPPED | OUTPATIENT
Start: 2024-07-19

## 2024-07-19 RX ORDER — ZOLPIDEM TARTRATE 10 MG/1
10 TABLET ORAL NIGHTLY PRN
Qty: 30 TABLET | Refills: 0 | OUTPATIENT
Start: 2024-07-19

## 2024-07-19 NOTE — TELEPHONE ENCOUNTER
Duplicate request - Rx sent 7/19/24    zolpidem 10 MG Oral Tab 30 tablet 2 7/19/2024 --    Sig - Route: Take 1 tablet (10 mg total) by mouth nightly as needed. - Oral    Sent to pharmacy as: Zolpidem Tartrate 10 MG Oral Tablet (Ambien)    E-Prescribing Status: Receipt confirmed by pharmacy (7/19/2024  9:14 AM CDT)        [id number] - [place], [place] - [address] AT [facility] [address], [phone], [phone]

## 2024-08-13 DIAGNOSIS — N52.9 ERECTILE DYSFUNCTION, UNSPECIFIED ERECTILE DYSFUNCTION TYPE: ICD-10-CM

## 2024-08-14 RX ORDER — TADALAFIL 10 MG/1
TABLET ORAL
Qty: 6 TABLET | Refills: 1 | Status: SHIPPED | OUTPATIENT
Start: 2024-08-14

## 2024-08-14 NOTE — TELEPHONE ENCOUNTER
Name from pharmacy: TADALAFIL 10MG TABLETS         Will file in chart as: TADALAFIL 10 MG Oral Tab    Sig: TAKE 1 TABLET BY MOUTH 1 HOUR BEFORE SEXUAL ACTIVITY. MAX 10 MG IN 24 HOURS    Disp: 6 tablet    Refills: 1 (Pharmacy requested: Not specified)    Start: 8/13/2024    Class: Normal    Non-formulary For: Erectile dysfunction, unspecified erectile dysfunction type    Last ordered: 6 months ago (1/25/2024) by Dior Granados MD    Last refill: 6/15/2024    Rx #: [id number]    Genitourinary Medications [id number]/13/2024 05:51 PM   Protocol Details Patient does not have pulmonary hypertension on problem list    In person appointment or virtual visit in the past 12 mos or appointment in next 3 mos      [facility] at: [id number] [place], [place] - [address] AT [facility] [address], [phone], [phone]

## 2024-09-08 DIAGNOSIS — R11.0 NAUSEA: ICD-10-CM

## 2024-09-11 RX ORDER — ONDANSETRON 4 MG/1
4 TABLET, FILM COATED ORAL EVERY 8 HOURS PRN
Qty: 20 TABLET | Refills: 0 | OUTPATIENT
Start: 2024-09-11

## 2024-09-11 RX ORDER — SEMAGLUTIDE 2.4 MG/.75ML
2.4 INJECTION, SOLUTION SUBCUTANEOUS WEEKLY
COMMUNITY
Start: 2024-08-15

## 2024-09-11 NOTE — TELEPHONE ENCOUNTER
Requesting Ondansetron 4mg  Last OV: 6/18/24 Telemedicine  RTC: prn  Last Rx'd 6/18/24 #20 with 2 refills    No future appointments.    Non-protocol med:  Rx pended and routed for approval/denial

## 2024-10-05 DIAGNOSIS — E66.812 CLASS 2 SEVERE OBESITY DUE TO EXCESS CALORIES WITH SERIOUS COMORBIDITY AND BODY MASS INDEX (BMI) OF 35.0 TO 35.9 IN ADULT (HCC): Primary | ICD-10-CM

## 2024-10-05 DIAGNOSIS — E66.01 CLASS 2 SEVERE OBESITY DUE TO EXCESS CALORIES WITH SERIOUS COMORBIDITY AND BODY MASS INDEX (BMI) OF 35.0 TO 35.9 IN ADULT (HCC): Primary | ICD-10-CM

## 2024-10-07 NOTE — TELEPHONE ENCOUNTER
Requesting Wegovy 2.4mg  Last OV: 6/18/24 Telemedicine  RTC: prn  Last Rx'd 6/12/24 #3mL with 2 refills    No future appointments.    Non-protocol med:  Rx pended and routed for approval/denial

## 2024-10-08 RX ORDER — SEMAGLUTIDE 2.4 MG/.75ML
2.4 INJECTION, SOLUTION SUBCUTANEOUS WEEKLY
Qty: 3 ML | Refills: 0 | Status: SHIPPED | OUTPATIENT
Start: 2024-10-08

## 2024-10-09 DIAGNOSIS — F51.01 PRIMARY INSOMNIA: ICD-10-CM

## 2024-10-09 NOTE — TELEPHONE ENCOUNTER
Sent MyChart for patient that medication was refilled but will need to schedule an appointment for further refills.

## 2024-10-10 RX ORDER — ZOLPIDEM TARTRATE 10 MG/1
10 TABLET ORAL NIGHTLY PRN
Qty: 30 TABLET | Refills: 2 | Status: SHIPPED | OUTPATIENT
Start: 2024-10-10

## 2024-11-01 DIAGNOSIS — E66.01 CLASS 2 SEVERE OBESITY DUE TO EXCESS CALORIES WITH SERIOUS COMORBIDITY AND BODY MASS INDEX (BMI) OF 35.0 TO 35.9 IN ADULT (HCC): ICD-10-CM

## 2024-11-01 DIAGNOSIS — E66.812 CLASS 2 SEVERE OBESITY DUE TO EXCESS CALORIES WITH SERIOUS COMORBIDITY AND BODY MASS INDEX (BMI) OF 35.0 TO 35.9 IN ADULT (HCC): ICD-10-CM

## 2024-11-05 RX ORDER — SEMAGLUTIDE 2.4 MG/.75ML
2.4 INJECTION, SOLUTION SUBCUTANEOUS WEEKLY
Qty: 3 ML | Refills: 0 | Status: SHIPPED | OUTPATIENT
Start: 2024-11-05

## 2024-11-05 NOTE — TELEPHONE ENCOUNTER
Requesting Wegovy 2.4mg  Last OV: 6/18/24  RTC: prn  Last Rx'd 10/8/24 #3mL with 0 refills    Future Appointments   Date Time Provider Department Center   1/20/2025  3:00 PM Dior Granados MD [facility] [address] [facility] [address]       Non-protocol med:  Rx pended and routed for approval/denial

## 2024-11-06 DIAGNOSIS — F51.01 PRIMARY INSOMNIA: ICD-10-CM

## 2024-11-06 DIAGNOSIS — R11.0 NAUSEA: ICD-10-CM

## 2024-11-06 DIAGNOSIS — N52.9 ERECTILE DYSFUNCTION, UNSPECIFIED ERECTILE DYSFUNCTION TYPE: ICD-10-CM

## 2024-11-06 RX ORDER — SEMAGLUTIDE 2.4 MG/.75ML
2.4 INJECTION, SOLUTION SUBCUTANEOUS WEEKLY
Qty: 3 ML | Refills: 0 | OUTPATIENT
Start: 2024-11-06

## 2024-11-06 NOTE — TELEPHONE ENCOUNTER
Duplicate request - Rx sent 11/5/24    WEGOVY 2.4 MG/0.75ML Subcutaneous Solution Auto-injector 3 mL 0 11/5/2024 --    Sig - Route: INJECT 0.75 ML (2.4 MG TOTAL) INTO THE SKIN ONCE A WEEK. - Subcutaneous    Sent to pharmacy as: Wegovy 2.4 MG/0.75ML Subcutaneous Solution Auto-injector (semaglutide-weight management)    E-Prescribing Status: Receipt confirmed by pharmacy (11/5/2024 11:08 AM CST)        [id number] - [place], [place] - [address] [phone], [phone]

## 2024-11-07 RX ORDER — TADALAFIL 10 MG/1
TABLET ORAL
Qty: 6 TABLET | Refills: 1 | Status: SHIPPED | OUTPATIENT
Start: 2024-11-07

## 2024-11-07 RX ORDER — ONDANSETRON 4 MG/1
4 TABLET, FILM COATED ORAL EVERY 8 HOURS PRN
Qty: 20 TABLET | Refills: 2 | Status: SHIPPED | OUTPATIENT
Start: 2024-11-07

## 2024-11-07 RX ORDER — ZOLPIDEM TARTRATE 10 MG/1
10 TABLET ORAL NIGHTLY PRN
Qty: 30 TABLET | Refills: 2 | Status: SHIPPED | OUTPATIENT
Start: 2024-11-07

## 2024-11-07 NOTE — TELEPHONE ENCOUNTER
Requesting Tadalafil 10mg  LOV: 6/18/24 Telemedicine  RTC: prn  Last Relevant Labs:   Filled: 8/14/24 #6 with 1 refills    Future Appointments   Date Time Provider Department Center   1/20/2025  3:00 PM Dior Granados MD [facility] [address] [facility] [address]     Genitourinary Medications [id number]/06/2024 12:54 PM   Protocol Details   Patient does not have pulmonary hypertension on problem list    In person appointment or virtual visit in the past 12 mos or appointment in next 3 mos     Rx sent to pharmacy per protocol

## 2024-11-07 NOTE — TELEPHONE ENCOUNTER
Requesting Ondansetron 4mg  Last OV: 66/18/24 Telemedicine  RTC: prn  Last Rx'd 6/18/24 #20 with 2 refills    Future Appointments   Date Time Provider Department Center   1/20/2025  3:00 PM Dior Granados MD [facility] [address] [facility] [address]       Non-protocol med:  Rx pended and routed for approval/denial

## 2024-11-22 NOTE — TELEPHONE ENCOUNTER
Emi has been out of the Zolpidem 10 mg for a while. Patient was able to find it at [facility] on [address] in [place]. Please resend the prescription to this pharmacy. Reviewed results with Niranjan Peacock and patient verbalized understanding of results.    Thank you,  Katy LIU RN  Triage Nurse [facility]  11/22/24    11:33 EST

## 2024-11-29 DIAGNOSIS — E66.812 CLASS 2 SEVERE OBESITY DUE TO EXCESS CALORIES WITH SERIOUS COMORBIDITY AND BODY MASS INDEX (BMI) OF 35.0 TO 35.9 IN ADULT (HCC): ICD-10-CM

## 2024-11-29 DIAGNOSIS — E66.01 CLASS 2 SEVERE OBESITY DUE TO EXCESS CALORIES WITH SERIOUS COMORBIDITY AND BODY MASS INDEX (BMI) OF 35.0 TO 35.9 IN ADULT (HCC): ICD-10-CM

## 2024-11-30 DIAGNOSIS — E66.812 CLASS 2 SEVERE OBESITY DUE TO EXCESS CALORIES WITH SERIOUS COMORBIDITY AND BODY MASS INDEX (BMI) OF 35.0 TO 35.9 IN ADULT (HCC): ICD-10-CM

## 2024-11-30 DIAGNOSIS — E66.01 CLASS 2 SEVERE OBESITY DUE TO EXCESS CALORIES WITH SERIOUS COMORBIDITY AND BODY MASS INDEX (BMI) OF 35.0 TO 35.9 IN ADULT (HCC): ICD-10-CM

## 2024-12-04 NOTE — TELEPHONE ENCOUNTER
Requesting Wegovy 2.4mg  Last OV: 6/18/24 Telemedicine  RTC: prn  Last Rx'd 11/5/24 #3mL with 0 refills    Future Appointments   Date Time Provider Department Center   1/20/2025  3:00 PM Dior Granados MD [facility] [address] [facility] [address]       Non-protocol med:  Rx pended and routed for approval/denial

## 2024-12-05 DIAGNOSIS — E66.01 CLASS 2 SEVERE OBESITY DUE TO EXCESS CALORIES WITH SERIOUS COMORBIDITY AND BODY MASS INDEX (BMI) OF 35.0 TO 35.9 IN ADULT (HCC): ICD-10-CM

## 2024-12-05 DIAGNOSIS — E66.812 CLASS 2 SEVERE OBESITY DUE TO EXCESS CALORIES WITH SERIOUS COMORBIDITY AND BODY MASS INDEX (BMI) OF 35.0 TO 35.9 IN ADULT (HCC): ICD-10-CM

## 2024-12-06 RX ORDER — SEMAGLUTIDE 2.4 MG/.75ML
2.4 INJECTION, SOLUTION SUBCUTANEOUS WEEKLY
Qty: 3 ML | Refills: 0 | Status: SHIPPED | OUTPATIENT
Start: 2024-12-06

## 2024-12-09 RX ORDER — SEMAGLUTIDE 2.4 MG/.75ML
2.4 INJECTION, SOLUTION SUBCUTANEOUS WEEKLY
Qty: 3 ML | Refills: 0 | OUTPATIENT
Start: 2024-12-09

## 2024-12-09 NOTE — TELEPHONE ENCOUNTER
Duplicate request - Rx sent 12/6/24    semaglutide-weight management (WEGOVY) 2.4 MG/0.75ML Subcutaneous Solution Auto-injector 3 mL 0 12/6/2024 --    Sig - Route: Inject 0.75 mL (2.4 mg total) into the skin once a week. - Subcutaneous    Sent to pharmacy as: Wegovy 2.4 MG/0.75ML Subcutaneous Solution Auto-injector (semaglutide-weight management)    E-Prescribing Status: Receipt confirmed by pharmacy (12/6/2024  9:26 AM CST)        [id number] - [place], [place] - [address] [phone], [phone]

## 2024-12-27 DIAGNOSIS — E66.01 CLASS 2 SEVERE OBESITY DUE TO EXCESS CALORIES WITH SERIOUS COMORBIDITY AND BODY MASS INDEX (BMI) OF 35.0 TO 35.9 IN ADULT (HCC): ICD-10-CM

## 2024-12-27 DIAGNOSIS — E66.812 CLASS 2 SEVERE OBESITY DUE TO EXCESS CALORIES WITH SERIOUS COMORBIDITY AND BODY MASS INDEX (BMI) OF 35.0 TO 35.9 IN ADULT (HCC): ICD-10-CM

## 2024-12-27 RX ORDER — SEMAGLUTIDE 2.4 MG/.75ML
2.4 INJECTION, SOLUTION SUBCUTANEOUS WEEKLY
Qty: 3 ML | Refills: 0 | Status: SHIPPED | OUTPATIENT
Start: 2024-12-27

## 2024-12-27 NOTE — TELEPHONE ENCOUNTER
Requesting Wegovy 2.5  Last OV: Telemed 6/18/24  Last Rx'd 12/6/24    Future Appointments   Date Time Provider Department Center   1/20/2025  3:00 PM Dior Granados MD [facility] [address] [facility] [address]     Non-protocol med:  Rx pended and routed for approval/denial

## 2025-01-01 DIAGNOSIS — R11.0 NAUSEA: ICD-10-CM

## 2025-01-01 DIAGNOSIS — F51.01 PRIMARY INSOMNIA: ICD-10-CM

## 2025-01-02 RX ORDER — ONDANSETRON 4 MG/1
4 TABLET, ORALLY DISINTEGRATING ORAL EVERY 8 HOURS PRN
Qty: 15 TABLET | Refills: 1 | OUTPATIENT
Start: 2025-01-02

## 2025-01-02 RX ORDER — ZOLPIDEM TARTRATE 10 MG/1
10 TABLET ORAL NIGHTLY PRN
Qty: 30 TABLET | Refills: 0 | Status: SHIPPED | OUTPATIENT
Start: 2025-01-02

## 2025-01-02 RX ORDER — ONDANSETRON 4 MG/1
4 TABLET, ORALLY DISINTEGRATING ORAL EVERY 8 HOURS PRN
Qty: 15 TABLET | Refills: 0 | Status: SHIPPED | OUTPATIENT
Start: 2025-01-02

## 2025-01-02 NOTE — TELEPHONE ENCOUNTER
Requesting Ondansetron 4mg  Last Rx'd 11/7/24 #20 with 1 refill    Requesting Zolpidem 10mg  Last Rx'd 11/7/24 #20 with 1 refill    Last OV: 6/18/24 Telemedicine  RTC: prn    Future Appointments   Date Time Provider Department Center   1/20/2025  3:00 PM Dior Granados MD [facility] [address] [facility] [address]       Controlled med:  Rx pended and routed for approval/denial

## 2025-01-03 DIAGNOSIS — I10 PRIMARY HYPERTENSION: ICD-10-CM

## 2025-01-03 RX ORDER — LISINOPRIL AND HYDROCHLOROTHIAZIDE 20; 25 MG/1; MG/1
1 TABLET ORAL DAILY
Qty: 30 TABLET | Refills: 0 | Status: SHIPPED | OUTPATIENT
Start: 2025-01-03

## 2025-01-20 ENCOUNTER — OFFICE VISIT (OUTPATIENT)
Dept: FAMILY MEDICINE CLINIC | Facility: CLINIC | Age: 50
End: 2025-01-20
Payer: COMMERCIAL

## 2025-01-20 VITALS
WEIGHT: 222.5 LBS | TEMPERATURE: 98 F | OXYGEN SATURATION: 99 % | BODY MASS INDEX: 31.15 KG/M2 | HEIGHT: 71 IN | DIASTOLIC BLOOD PRESSURE: 80 MMHG | RESPIRATION RATE: 16 BRPM | HEART RATE: 91 BPM | SYSTOLIC BLOOD PRESSURE: 132 MMHG

## 2025-01-20 DIAGNOSIS — R11.0 NAUSEA: ICD-10-CM

## 2025-01-20 DIAGNOSIS — Z00.00 WELL ADULT EXAM: Primary | ICD-10-CM

## 2025-01-20 DIAGNOSIS — E66.01 CLASS 2 SEVERE OBESITY DUE TO EXCESS CALORIES WITH SERIOUS COMORBIDITY AND BODY MASS INDEX (BMI) OF 35.0 TO 35.9 IN ADULT (HCC): ICD-10-CM

## 2025-01-20 DIAGNOSIS — E78.00 HYPERCHOLESTEREMIA: ICD-10-CM

## 2025-01-20 DIAGNOSIS — F51.01 PRIMARY INSOMNIA: ICD-10-CM

## 2025-01-20 DIAGNOSIS — N52.9 ERECTILE DYSFUNCTION, UNSPECIFIED ERECTILE DYSFUNCTION TYPE: ICD-10-CM

## 2025-01-20 DIAGNOSIS — E66.812 CLASS 2 SEVERE OBESITY DUE TO EXCESS CALORIES WITH SERIOUS COMORBIDITY AND BODY MASS INDEX (BMI) OF 35.0 TO 35.9 IN ADULT (HCC): ICD-10-CM

## 2025-01-20 DIAGNOSIS — I10 PRIMARY HYPERTENSION: ICD-10-CM

## 2025-01-20 DIAGNOSIS — Z13.31 NEGATIVE DEPRESSION SCREENING: ICD-10-CM

## 2025-01-20 DIAGNOSIS — R73.03 PREDIABETES: ICD-10-CM

## 2025-01-20 RX ORDER — TADALAFIL 10 MG/1
TABLET ORAL
Qty: 6 TABLET | Refills: 2 | Status: SHIPPED | OUTPATIENT
Start: 2025-01-20

## 2025-01-20 RX ORDER — SEMAGLUTIDE 2.4 MG/.75ML
2.4 INJECTION, SOLUTION SUBCUTANEOUS WEEKLY
Qty: 9 ML | Refills: 1 | Status: SHIPPED | OUTPATIENT
Start: 2025-01-20

## 2025-01-20 RX ORDER — CHLORAL HYDRATE 500 MG
1000 CAPSULE ORAL DAILY
COMMUNITY

## 2025-01-20 RX ORDER — ZOLPIDEM TARTRATE 10 MG/1
10 TABLET ORAL NIGHTLY PRN
Qty: 90 TABLET | Refills: 1 | Status: SHIPPED | OUTPATIENT
Start: 2025-01-20

## 2025-01-20 RX ORDER — LISINOPRIL AND HYDROCHLOROTHIAZIDE 20; 25 MG/1; MG/1
1 TABLET ORAL DAILY
Qty: 90 TABLET | Refills: 3 | Status: SHIPPED | OUTPATIENT
Start: 2025-01-20

## 2025-01-20 RX ORDER — ONDANSETRON 4 MG/1
4 TABLET, FILM COATED ORAL EVERY 8 HOURS PRN
Qty: 30 TABLET | Refills: 1 | Status: SHIPPED | OUTPATIENT
Start: 2025-01-20

## 2025-01-20 NOTE — PROGRESS NOTES
Subjective:      Chief Complaint   Patient presents with    Physical     HISTORY OF PRESENT ILLNESS  HPI  HPI obtained per patient report.  Bernabe Crespo is a pleasant 49 year old male presenting for his annual physical.   He has been feeling well since his LOV. He continues to be successful in his weight management journey with the assistance of Wegovy. He notes that it is easier for him to adhere to healthy dietary changes and keep active with Wegovy. Along his weight loss journey, his joint pains and sleep apnea symptoms have improved. His temporary nausea for a few hours after a dose of Wegovy is resolved by a dose of zofran. He is otherwise tolerating Wegovy well without any issues.   He presents with copies of his lab results completed through his employer.    PAST PATIENT HISTORY  Past Medical History:    Allergic rhinitis    Anxiety    HTN (hypertension)    Hypercholesteremia    Obesity    Prediabetes    Sleep apnea     No past surgical history on file.    CURRENT MEDICATIONS  Medications Taking[1]    HEALTH MAINTENANCE  Immunization History   Administered Date(s) Administered    Covid-19 Vaccine Pfizer 30 mcg/0.3 ml 02/07/2021, 02/28/2021, 10/11/2021    FLULAVAL 6 months & older 0.5 ml Prefilled syringe (54126) 09/19/2019    FLUZONE 6 months and older PFS 0.5 ml (66303) 09/19/2019, 10/01/2020, 09/29/2021, 09/17/2023    Influenza 10/23/2015, 10/03/2016, 09/28/2020, 10/09/2022, 09/09/2023    Influenza Vaccine, trivalent (IIV3), PF 0.5mL (73987) 09/24/2024    TDAP 07/27/2013, 06/15/2015       ALLERGIES AND DRUG REACTIONS  Allergies[2]    Family History   Problem Relation Age of Onset    Cancer Mother         AML.    Survivor     Social History     Socioeconomic History    Marital status:    Tobacco Use    Smoking status: Never    Smokeless tobacco: Never   Substance and Sexual Activity    Alcohol use: Not Currently     Comment: no drink in 2 years    Drug use: No   Other Topics Concern    Caffeine  Concern No    Exercise No    Seat Belt No    Special Diet No    Stress Concern Yes    Weight Concern Yes     Social Drivers of Health     Physical Activity: Insufficiently Active (1/4/2021)    [facility]    Exercise Vital Sign     Days of Exercise per Week: 3 days     Minutes of Exercise per Session: 30 min    [facility] from [facility], [facility]    Social Connections    Received from [facility], [facility]    Housing Stability       Review of Systems   All other systems reviewed and are negative.         Objective:      /80   Pulse 91   Temp 97.6 °F (36.4 °C) (Temporal)   Resp 16   Ht 5' 11\" (1.803 m)   Wt 222 lb 8 oz (100.9 kg)   SpO2 99%   BMI 31.03 kg/m²   Body mass index is 31.03 kg/m².    Physical Exam  Vitals reviewed.   Constitutional:       General: He is not in acute distress.     Appearance: He is not ill-appearing, toxic-appearing or diaphoretic.   HENT:      Head: Normocephalic and atraumatic.   Eyes:      General: No scleral icterus.        Right eye: No discharge.         Left eye: No discharge.      Extraocular Movements: Extraocular movements intact.      Conjunctiva/sclera: Conjunctivae normal.   Neck:      Thyroid: No thyroid mass, thyromegaly or thyroid tenderness.      Vascular: No carotid bruit.   Cardiovascular:      Rate and Rhythm: Normal rate and regular rhythm.      Heart sounds: Normal heart sounds.   Pulmonary:      Effort: Pulmonary effort is normal.      Breath sounds: Normal breath sounds.   Abdominal:      General: Bowel sounds are normal. There is no distension.      Palpations: Abdomen is soft. There is no mass.      Tenderness: There is no abdominal tenderness. There is no guarding or rebound.      Hernia: No hernia is present.   Musculoskeletal:      Cervical back: Neck supple. No tenderness.      Right lower leg: No edema.      Left lower  leg: No edema.   Lymphadenopathy:      Cervical: No cervical adenopathy.   Skin:     General: Skin is warm and dry.      Coloration: Skin is not jaundiced or pale.      Findings: No bruising, erythema or rash.   Neurological:      Mental Status: He is alert and oriented to person, place, and time.   Psychiatric:         Mood and Affect: Mood normal.            Assessment and Plan:      1. Well adult exam (Primary)  2. Negative depression screening  3. Class 2 severe obesity due to excess calories with serious comorbidity and body mass index (BMI) of 35.0 to 35.9 in adult (HCC)  -     Wegovy; Inject 0.75 mL (2.4 mg total) into the skin once a week.  Dispense: 9 mL; Refill: 1  4. Body mass index 35.0-35.9, adult  -     Wegovy; Inject 0.75 mL (2.4 mg total) into the skin once a week.  Dispense: 9 mL; Refill: 1  5. Nausea  -     Ondansetron HCl; Take 1 tablet (4 mg total) by mouth every 8 (eight) hours as needed for Nausea.  Dispense: 30 tablet; Refill: 1  6. Prediabetes  7. Hypercholesteremia  8. Primary hypertension  -     Lisinopril-hydroCHLOROthiazide; Take 1 tablet by mouth daily.  Dispense: 90 tablet; Refill: 3  9. Primary insomnia  -     Zolpidem Tartrate; Take 1 tablet (10 mg total) by mouth nightly as needed.  Dispense: 90 tablet; Refill: 1  10. Erectile dysfunction, unspecified erectile dysfunction type  -     Tadalafil; TAKE 1 TABLET BY MOUTH 1 HOUR BEFORE SEXUAL ACTIVITY. MAX 10 MG IN 24 HOURS  Dispense: 6 tablet; Refill: 2    No follow-ups on file.    Physical  - he is overall doing very well  - his colonoscopy is UTD and next due 7/2033  - he is UTD on his immunizations  - we discussed continuation of his healthy lifestyle changes    BMI>35  - he is doing very well in his weight management journey with his healthy nutrition plan, regular exercise, and Wegovy  - he is down 58 lbs since starting Wegovy 1.5 years ago, including weight loss of 11 lbs since his LOV 1 year ago  - his goal weight is 200 lbs, so  we will continue his current regimen including his maintenance dose of Wegovy and check back in in 6 months  - he may continue zofran as needed for nausea associated with Wegovy injections    Prediabetes  - improving    Hypercholesterolemia  - stable  - he has started 2 capsules of omega-3 supplements daily and increased his fiber intake since the beginning of the year, so his levels will likely improve with this addition to his regimen  - we will CTM annually at this time    HTN  - well controlled  - we will continue his current regimen    Patient verbalized understanding of assessment and recommendations. All questions and concerns were addressed.    Electronically signed by Dior Granados MD         [1]   Outpatient Medications Marked as Taking for the 1/20/25 encounter (Office Visit) with Dior Granados MD   Medication Sig Dispense Refill    omega-3 fatty acids 1000 MG Oral Cap Take 1,000 mg by mouth daily.      FIBER ADULT GUMMIES OR Take by mouth.      zolpidem 10 MG Oral Tab Take 1 tablet (10 mg total) by mouth nightly as needed. 90 tablet 1    Tadalafil 10 MG Oral Tab TAKE 1 TABLET BY MOUTH 1 HOUR BEFORE SEXUAL ACTIVITY. MAX 10 MG IN 24 HOURS 6 tablet 2    semaglutide-weight management (WEGOVY) 2.4 MG/0.75ML Subcutaneous Solution Auto-injector Inject 0.75 mL (2.4 mg total) into the skin once a week. 9 mL 1    lisinopril-hydroCHLOROthiazide 20-25 MG Oral Tab Take 1 tablet by mouth daily. 90 tablet 3    ondansetron (ZOFRAN) 4 mg tablet Take 1 tablet (4 mg total) by mouth every 8 (eight) hours as needed for Nausea. 30 tablet 1    cyclobenzaprine 10 MG Oral Tab Take 1 tablet (10 mg total) by mouth 3 (three) times daily as needed for Muscle spasms. 30 tablet 2    cetirizine 10 MG Oral Tab Take by mouth daily.      Fluticasone Propionate 50 MCG/ACT Nasal Suspension 2 sprays by Each Nare route daily. 1 Bottle 5   [2]   Allergies  Allergen Reactions    Ropinirole OTHER (SEE COMMENTS) and FEVER

## 2025-02-21 DIAGNOSIS — R11.0 NAUSEA: ICD-10-CM

## 2025-02-24 NOTE — TELEPHONE ENCOUNTER
Requesting Ondansetron 4mg  Last OV: 1/20/25 Physical  RTC: 6 months  Last Rx'd 1/20/25 #30    No future appointments.    Non-protocol med:  Rx pended and routed for approval/denial

## 2025-02-25 RX ORDER — ONDANSETRON 4 MG/1
4 TABLET, ORALLY DISINTEGRATING ORAL EVERY 8 HOURS PRN
Qty: 20 TABLET | Refills: 1 | Status: SHIPPED | OUTPATIENT
Start: 2025-02-25

## 2025-03-21 DIAGNOSIS — N52.9 ERECTILE DYSFUNCTION, UNSPECIFIED ERECTILE DYSFUNCTION TYPE: ICD-10-CM

## 2025-03-21 DIAGNOSIS — R11.0 NAUSEA: ICD-10-CM

## 2025-03-21 DIAGNOSIS — E66.812 CLASS 2 SEVERE OBESITY DUE TO EXCESS CALORIES WITH SERIOUS COMORBIDITY AND BODY MASS INDEX (BMI) OF 35.0 TO 35.9 IN ADULT (HCC): ICD-10-CM

## 2025-03-21 DIAGNOSIS — E66.01 CLASS 2 SEVERE OBESITY DUE TO EXCESS CALORIES WITH SERIOUS COMORBIDITY AND BODY MASS INDEX (BMI) OF 35.0 TO 35.9 IN ADULT (HCC): ICD-10-CM

## 2025-03-24 RX ORDER — ONDANSETRON 4 MG/1
4 TABLET, ORALLY DISINTEGRATING ORAL EVERY 8 HOURS PRN
Qty: 20 TABLET | Refills: 1 | Status: SHIPPED | OUTPATIENT
Start: 2025-03-24

## 2025-03-24 RX ORDER — SEMAGLUTIDE 2.4 MG/.75ML
2.4 INJECTION, SOLUTION SUBCUTANEOUS WEEKLY
Qty: 9 ML | Refills: 1 | Status: SHIPPED | OUTPATIENT
Start: 2025-03-24

## 2025-03-24 RX ORDER — TADALAFIL 10 MG/1
TABLET ORAL
Qty: 6 TABLET | Refills: 2 | Status: SHIPPED | OUTPATIENT
Start: 2025-03-24

## 2025-03-24 NOTE — TELEPHONE ENCOUNTER
Requested Renewals     Tadalafil 10 MG Oral Tab         Sig: TAKE 1 TABLET BY MOUTH 1 HOUR BEFORE SEXUAL ACTIVITY. MAX 10 MG IN 24 HOURS    Disp: 6 tablet    Refills: 2    Start: 3/21/2025    Class: Normal    Non-formulary For: Erectile dysfunction, unspecified erectile dysfunction type    Last ordered: 2 months ago (1/20/2025) by Dior Granados MD        semaglutide-weight management (WEGOVY) 2.4 MG/0.75ML Subcutaneous Solution Auto-injector         Sig: Inject 0.75 mL (2.4 mg total) into the skin once a week.    Disp: 9 mL    Refills: 1    Start: 3/21/2025    Class: Normal    Non-formulary For: Class 2 severe obesity due to excess calories with serious comorbidity and body mass index (BMI) of 35.0 to 35.9 in adult (HCC); Body mass index 35.0-35.9, adult    Last ordered: 2 months ago (1/20/2025) by Dior Granados MD        ondansetron 4 MG Oral Tablet Dispersible         Sig: Place 1 tablet (4 mg total) under the tongue every 8 (eight) hours as needed for Nausea.    Disp: 20 tablet    Refills: 1    Start: 3/21/2025    Class: Normal    Non-formulary For: Nausea    Last ordered: 3 weeks ago (2/25/2025) by Dior Granados MD          No future appointments.  LOV: 1/20/25 for physical exam  RTC: 6 months      -medication pended for review.

## 2025-03-25 NOTE — TELEPHONE ENCOUNTER
Detail Level: Detailed
Duplicate request - RX sent 1/2/25  
Detail Level: Zone
Sunscreen Recommendations: la roche posay - many samples given

## 2025-06-17 DIAGNOSIS — S39.012D STRAIN OF LUMBAR REGION, SUBSEQUENT ENCOUNTER: ICD-10-CM

## 2025-06-18 NOTE — TELEPHONE ENCOUNTER
Requesting Cyclobenzaprine 10mg  Last OV: 1/20/25 Physical  RTC: 6 months  Last Rx'd 1/25/24 #30 with 2 refills    No future appointments.    Non-protocol med:  Rx pended and routed for approval/denial

## 2025-06-19 RX ORDER — CYCLOBENZAPRINE HCL 10 MG
10 TABLET ORAL 3 TIMES DAILY PRN
Qty: 30 TABLET | Refills: 1 | Status: SHIPPED | OUTPATIENT
Start: 2025-06-19

## 2025-07-12 DIAGNOSIS — R11.0 NAUSEA: ICD-10-CM

## 2025-07-14 RX ORDER — ONDANSETRON 4 MG/1
4 TABLET, ORALLY DISINTEGRATING ORAL EVERY 8 HOURS PRN
Qty: 20 TABLET | Refills: 1 | Status: SHIPPED | OUTPATIENT
Start: 2025-07-14

## 2025-07-14 NOTE — TELEPHONE ENCOUNTER
Requesting Zofran 4mg  Last OV: 1/20/25 Physical  RTC: 6 months  Last Rx'd 3/24/25 #20 with 1 refill    No future appointments.    Non-protocol med:  Rx pended and routed for approval/denial

## 2025-07-23 DIAGNOSIS — F51.01 PRIMARY INSOMNIA: ICD-10-CM

## 2025-07-25 ENCOUNTER — PATIENT MESSAGE (OUTPATIENT)
Dept: FAMILY MEDICINE CLINIC | Facility: CLINIC | Age: 50
End: 2025-07-25

## 2025-07-25 DIAGNOSIS — F51.01 PRIMARY INSOMNIA: ICD-10-CM

## 2025-07-28 RX ORDER — ZOLPIDEM TARTRATE 10 MG/1
10 TABLET ORAL NIGHTLY PRN
Qty: 90 TABLET | Refills: 0 | Status: SHIPPED | OUTPATIENT
Start: 2025-07-28

## 2025-07-28 NOTE — TELEPHONE ENCOUNTER
Requesting Zolpidem 10mg  Last OV: 1/20/25 Physical  RTC: 6 months  Last Rx'd 1/20/25 #90 with 1 refill    No future appointments.    Per [place] , last dispensed 4/25/25 #90    Controlled med:  Rx pended and routed for approval/denial

## 2025-07-30 RX ORDER — ZOLPIDEM TARTRATE 10 MG/1
10 TABLET ORAL NIGHTLY PRN
Qty: 30 TABLET | Refills: 0 | OUTPATIENT
Start: 2025-07-30

## 2025-08-18 ENCOUNTER — TELEMEDICINE (OUTPATIENT)
Dept: FAMILY MEDICINE CLINIC | Facility: CLINIC | Age: 50
End: 2025-08-18

## 2025-08-18 DIAGNOSIS — E66.812 CLASS 2 SEVERE OBESITY DUE TO EXCESS CALORIES WITH SERIOUS COMORBIDITY AND BODY MASS INDEX (BMI) OF 39.0 TO 39.9 IN ADULT (HCC): Primary | ICD-10-CM

## 2025-08-18 DIAGNOSIS — I10 PRIMARY HYPERTENSION: ICD-10-CM

## 2025-08-18 DIAGNOSIS — R73.03 PREDIABETES: ICD-10-CM

## 2025-08-18 DIAGNOSIS — E78.00 HYPERCHOLESTEREMIA: ICD-10-CM

## 2025-08-18 DIAGNOSIS — E66.01 CLASS 2 SEVERE OBESITY DUE TO EXCESS CALORIES WITH SERIOUS COMORBIDITY AND BODY MASS INDEX (BMI) OF 39.0 TO 39.9 IN ADULT (HCC): Primary | ICD-10-CM

## (undated) DIAGNOSIS — F51.01 PRIMARY INSOMNIA: ICD-10-CM